# Patient Record
Sex: FEMALE | Race: BLACK OR AFRICAN AMERICAN | Employment: UNEMPLOYED | URBAN - METROPOLITAN AREA
[De-identification: names, ages, dates, MRNs, and addresses within clinical notes are randomized per-mention and may not be internally consistent; named-entity substitution may affect disease eponyms.]

---

## 2020-11-03 ENCOUNTER — HOSPITAL ENCOUNTER (EMERGENCY)
Age: 30
Discharge: HOME | End: 2020-11-03
Attending: EMERGENCY MEDICINE

## 2020-11-03 VITALS
DIASTOLIC BLOOD PRESSURE: 69 MMHG | HEART RATE: 76 BPM | OXYGEN SATURATION: 95 % | SYSTOLIC BLOOD PRESSURE: 115 MMHG | HEIGHT: 67 IN | BODY MASS INDEX: 18.83 KG/M2 | WEIGHT: 120 LBS | RESPIRATION RATE: 17 BRPM | TEMPERATURE: 98.1 F

## 2020-11-03 LAB
ANION GAP SERPL CALCULATED.3IONS-SCNC: 7 MMOL/L (ref 3–16)
BASOPHILS ABSOLUTE: 0 K/UL (ref 0–0.2)
BASOPHILS RELATIVE PERCENT: 0.7 %
BUN BLDV-MCNC: 9 MG/DL (ref 7–20)
CALCIUM SERPL-MCNC: 8.9 MG/DL (ref 8.3–10.6)
CHLORIDE BLD-SCNC: 105 MMOL/L (ref 99–110)
CO2: 25 MMOL/L (ref 21–32)
CREAT SERPL-MCNC: 0.8 MG/DL (ref 0.6–1.1)
EKG ATRIAL RATE: 73 BPM
EKG DIAGNOSIS: NORMAL
EKG P AXIS: 74 DEGREES
EKG P-R INTERVAL: 136 MS
EKG Q-T INTERVAL: 370 MS
EKG QRS DURATION: 70 MS
EKG QTC CALCULATION (BAZETT): 407 MS
EKG R AXIS: 101 DEGREES
EKG T AXIS: 55 DEGREES
EKG VENTRICULAR RATE: 73 BPM
EOSINOPHILS ABSOLUTE: 0.4 K/UL (ref 0–0.6)
EOSINOPHILS RELATIVE PERCENT: 5.6 %
GFR AFRICAN AMERICAN: >60
GFR NON-AFRICAN AMERICAN: >60
GLUCOSE BLD-MCNC: 96 MG/DL (ref 70–99)
HCG QUALITATIVE: NEGATIVE
HCT VFR BLD CALC: 40.1 % (ref 36–48)
HEMOGLOBIN: 13.4 G/DL (ref 12–16)
LYMPHOCYTES ABSOLUTE: 1.9 K/UL (ref 1–5.1)
LYMPHOCYTES RELATIVE PERCENT: 29.5 %
MCH RBC QN AUTO: 32.1 PG (ref 26–34)
MCHC RBC AUTO-ENTMCNC: 33.4 G/DL (ref 31–36)
MCV RBC AUTO: 96.1 FL (ref 80–100)
MONOCYTES ABSOLUTE: 0.4 K/UL (ref 0–1.3)
MONOCYTES RELATIVE PERCENT: 5.8 %
NEUTROPHILS ABSOLUTE: 3.7 K/UL (ref 1.7–7.7)
NEUTROPHILS RELATIVE PERCENT: 58.4 %
PDW BLD-RTO: 14.4 % (ref 12.4–15.4)
PLATELET # BLD: 164 K/UL (ref 135–450)
PMV BLD AUTO: 9.3 FL (ref 5–10.5)
POTASSIUM SERPL-SCNC: 4.9 MMOL/L (ref 3.5–5.1)
RBC # BLD: 4.17 M/UL (ref 4–5.2)
SODIUM BLD-SCNC: 137 MMOL/L (ref 136–145)
WBC # BLD: 6.4 K/UL (ref 4–11)

## 2020-11-03 PROCEDURE — 36415 COLL VENOUS BLD VENIPUNCTURE: CPT

## 2020-11-03 PROCEDURE — 6360000002 HC RX W HCPCS: Performed by: PHYSICIAN ASSISTANT

## 2020-11-03 PROCEDURE — 99284 EMERGENCY DEPT VISIT MOD MDM: CPT

## 2020-11-03 PROCEDURE — 93010 ELECTROCARDIOGRAM REPORT: CPT | Performed by: INTERNAL MEDICINE

## 2020-11-03 PROCEDURE — 85025 COMPLETE CBC W/AUTO DIFF WBC: CPT

## 2020-11-03 PROCEDURE — 80048 BASIC METABOLIC PNL TOTAL CA: CPT

## 2020-11-03 PROCEDURE — 2580000003 HC RX 258: Performed by: PHYSICIAN ASSISTANT

## 2020-11-03 PROCEDURE — 93005 ELECTROCARDIOGRAM TRACING: CPT | Performed by: EMERGENCY MEDICINE

## 2020-11-03 PROCEDURE — 84703 CHORIONIC GONADOTROPIN ASSAY: CPT

## 2020-11-03 PROCEDURE — 96365 THER/PROPH/DIAG IV INF INIT: CPT

## 2020-11-03 RX ORDER — LEVETIRACETAM 500 MG/5ML
1000 INJECTION, SOLUTION, CONCENTRATE INTRAVENOUS ONCE
Status: DISCONTINUED | OUTPATIENT
Start: 2020-11-03 | End: 2020-11-03

## 2020-11-03 RX ORDER — LEVETIRACETAM 500 MG/1
1000 TABLET ORAL 2 TIMES DAILY
COMMUNITY
End: 2020-11-03 | Stop reason: SDUPTHER

## 2020-11-03 RX ORDER — LEVETIRACETAM 10 MG/ML
1000 INJECTION INTRAVASCULAR ONCE
Status: COMPLETED | OUTPATIENT
Start: 2020-11-03 | End: 2020-11-03

## 2020-11-03 RX ORDER — 0.9 % SODIUM CHLORIDE 0.9 %
1000 INTRAVENOUS SOLUTION INTRAVENOUS ONCE
Status: COMPLETED | OUTPATIENT
Start: 2020-11-03 | End: 2020-11-03

## 2020-11-03 RX ORDER — LEVETIRACETAM 500 MG/1
TABLET ORAL
Qty: 150 TABLET | Refills: 0 | Status: SHIPPED | OUTPATIENT
Start: 2020-11-03 | End: 2021-01-27 | Stop reason: SDUPTHER

## 2020-11-03 RX ADMIN — SODIUM CHLORIDE 1000 ML: 9 INJECTION, SOLUTION INTRAVENOUS at 09:33

## 2020-11-03 RX ADMIN — LEVETIRACETAM 1000 MG: 10 INJECTION INTRAVENOUS at 10:28

## 2020-11-03 SDOH — HEALTH STABILITY: MENTAL HEALTH: HOW OFTEN DO YOU HAVE A DRINK CONTAINING ALCOHOL?: NEVER

## 2020-11-03 ASSESSMENT — ENCOUNTER SYMPTOMS
ABDOMINAL PAIN: 0
DIARRHEA: 0
RHINORRHEA: 0
SHORTNESS OF BREATH: 0
VOMITING: 0
COUGH: 0
NAUSEA: 0

## 2020-11-03 NOTE — ED PROVIDER NOTES
I personally evaluated and examined the patient in conjunction with the DON and agree with the assessment, treatment plan and disposition of the patient as recorded by the DON. I reviewed pertinent nursing notes, triage notes, vital signs, past medical history, family and social history, medications, and allergies. Complete review of systems was conducted by the DON and/or myself. Review of systems is negative except as documented in the history of present illness. Brief HPI: This is a 43-year-old female presents the emergency department chief complaint of breakthrough seizure. She reports that she has not been taking her seizure medications over the last couple of days. Physical Exam: Neuro: Alert and oriented ×3. Pupils are equal and reactive to light. Visual fields are tested and intact. Cranial nerves II through XII are tested and intact. No upper extremity drift. No lower extremity drift. Sensation is intact to light touch ×4 extremities. Muscle strength testing is 5/5×4 extremities. Finger to nose testing is normal. Normal intelligence and speech. Basic lab work performed is unremarkable. Given a bolus of Keppra here. She could not provide a urine specimen so this was discontinued. She is encouraged to take her seizure medications and follow-up with primary care and neurology and given strict return precautions. Note that she was given a prescription here for Keppra but reported to the nurse that she had enough at home. Patient instructed to follow up with their primary care doctor in one-two days and return to the emergency department if worsening of the condition or any other concerns. Please see discharge instructions for further delineation regarding the specific discharge instructions explained and given to the patient. EKG: EKG interpretation by ED physician: Normal axis, sinus rhythm at 73 bpm.  No obvious ischemic ST changes appreciated.     FINAL IMPRESSION     1. Breakthrough seizure [facility])            Electronically signed by:   Diogo Dawkins DO  11/03/20 3967

## 2020-11-03 NOTE — ED NOTES
Bed: 23  Expected date:   Expected time:   Means of arrival: Ambulance  Comments:  Bed [address] [facility]  11/03/20 2019

## 2020-11-03 NOTE — ED NOTES
Pt remains in bed at this time with no acute sign of distress. Bed in lowest position with wheels locked. Call light within reach.       Isai Ace RN  27/41/74 2500

## 2020-11-03 NOTE — ED PROVIDER NOTES
posterior oropharyngeal erythema. Eyes:      General:         Right eye: No discharge. Left eye: No discharge. Extraocular Movements: Extraocular movements intact. Conjunctiva/sclera: Conjunctivae normal.      Pupils: Pupils are equal, round, and reactive to light. Neck:      Musculoskeletal: Normal range of motion and neck supple. Trachea: No tracheal deviation. Cardiovascular:      Rate and Rhythm: Normal rate and regular rhythm. Heart sounds: No murmur. Pulmonary:      Effort: Pulmonary effort is normal. No respiratory distress. Breath sounds: Normal breath sounds. No wheezing or rales. Abdominal:      General: Bowel sounds are normal. There is no distension. Palpations: Abdomen is soft. Tenderness: There is no abdominal tenderness. There is no guarding. Musculoskeletal: Normal range of motion. Skin:     General: Skin is warm and dry. Neurological:      Mental Status: She is alert and oriented to person, place, and time. GCS: GCS eye subscore is 4. GCS verbal subscore is 5. GCS motor subscore is 6. Psychiatric:         Behavior: Behavior normal.         DIAGNOSTIC RESULTS   LABS:    Labs Reviewed   CBC WITH AUTO DIFFERENTIAL    Narrative:     Performed at:  [facility] [address], [place], [address]   Phone [phone]   BASIC METABOLIC PANEL    Narrative:     Performed at:  [facility] [address], [place], [facility] [address]   Phone [phone]   HCG, SERUM, QUALITATIVE    Narrative:     Performed at:  [facility] [address], [place], [address]   Phone [phone]       All other labs were within normal range or not returned as of this dictation. EKG: All EKG's are interpreted by the Emergency Department Physician in the absence of a cardiologist.  Please see their note for interpretation of EKG.       RADIOLOGY:   Non-plain film images such as CT, Ultrasound and MRI are read by the radiologist. Plain radiographic images are visualized and preliminarily interpreted by the ED Provider with the below findings:        Interpretation per the Radiologist below, if available at the time of this note:    No orders to display     No results found. PROCEDURES   Unless otherwise noted below, none     Procedures    CRITICAL CARE TIME   N/A    CONSULTS:  None      EMERGENCY DEPARTMENT COURSE and DIFFERENTIAL DIAGNOSIS/MDM:   Vitals:    Vitals:    11/03/20 1015 11/03/20 1030 11/03/20 1045 11/03/20 1100   BP:  123/75  115/69   Pulse: 71 77 79 76   Resp: 13 17 17 17   Temp:       SpO2: 99% 99% 97% 95%   Weight:       Height:           Patient was given the following medications:  Medications   0.9 % sodium chloride bolus (0 mLs Intravenous Stopped 11/3/20 1138)   levetiracetam (KEPPRA) 1000 mg/100 mL IVPB (0 mg Intravenous Stopped 11/3/20 1138)           Briefly, this is a 72-year-old female with a history of seizures who presents to the emergency department today for a breakthrough seizure. The patient does have a history of seizures although she is unclear the exact events of today. She does not feel that she has had a seizure. She tells me she is not been taking her Keppra for the past couple of days. She has no complaints. Physical exam is unremarkable. There are no focal neurological deficits. CBC shows no evidence of leukocytosis or anemia. BMP is unremarkable. Pregnancy is negative. EKG is documented by my attending, please see his note for further details    The patient was given fluids as well as a loading dose of Keppra in the ED, patient has remained asymptomatic. I feel that she likely had a breakthrough seizure as she did not take her medications for the past few days. Patient was referred to neurology for follow-up within 2 to 3 days. She is to return to the ED for any new or worsening symptoms.   Patient voiced understanding is agreeable with plan. Stable for discharge. My suspicions at this time for acute internal hemorrhage, subarachnoid hemorrhage, meningitis, ectopic pregnancy, life-threatening arrhythmia, status epilepticus or other emergent etiology    FINAL IMPRESSION      1.  Breakthrough seizure [facility])          DISPOSITION/PLAN   DISPOSITION        PATIENT REFERREDTO:  Obdulia Tran MD  [address] [place] [address], [address] [facility]  [phone]    Schedule an appointment as soon as possible for a visit       [facility] [place] [place]) Pre-Services  [phone]  Schedule an appointment as soon as possible for a visit       [facility] Emergency Department  [address] [place] [address]  [phone]    If symptoms worsen      DISCHARGE MEDICATIONS:  Discharge Medication List as of 11/3/2020 11:38 AM          DISCONTINUED MEDICATIONS:  Discharge Medication List as of 11/3/2020 11:38 AM                 (Please note that portions of this note were completed with a voice recognition program.  Efforts were made to edit the dictations but occasionally words are mis-transcribed.)    Brent Atkinson PA-C (electronically signed)            Brent Atkinson PA-C  11/03/20 0738

## 2020-12-14 ENCOUNTER — APPOINTMENT (OUTPATIENT)
Dept: CT IMAGING | Age: 30
End: 2020-12-14
Payer: COMMERCIAL

## 2020-12-14 ENCOUNTER — HOSPITAL ENCOUNTER (EMERGENCY)
Age: 30
Discharge: HOME | End: 2020-12-14
Attending: EMERGENCY MEDICINE
Payer: COMMERCIAL

## 2020-12-14 VITALS
HEIGHT: 71 IN | BODY MASS INDEX: 13.3 KG/M2 | OXYGEN SATURATION: 98 % | SYSTOLIC BLOOD PRESSURE: 114 MMHG | HEART RATE: 93 BPM | RESPIRATION RATE: 18 BRPM | TEMPERATURE: 99.3 F | WEIGHT: 95 LBS | DIASTOLIC BLOOD PRESSURE: 76 MMHG

## 2020-12-14 LAB
A/G RATIO: 1.6 (ref 1.1–2.2)
ALBUMIN SERPL-MCNC: 4.1 G/DL (ref 3.4–5)
ALP BLD-CCNC: 88 U/L (ref 40–129)
ALT SERPL-CCNC: 12 U/L (ref 10–40)
AMPHETAMINE SCREEN, URINE: NORMAL
ANION GAP SERPL CALCULATED.3IONS-SCNC: 9 MMOL/L (ref 3–16)
AST SERPL-CCNC: 14 U/L (ref 15–37)
BACTERIA: ABNORMAL /HPF
BARBITURATE SCREEN URINE: NORMAL
BASOPHILS ABSOLUTE: 0.1 K/UL (ref 0–0.2)
BASOPHILS RELATIVE PERCENT: 0.6 %
BENZODIAZEPINE SCREEN, URINE: NORMAL
BILIRUB SERPL-MCNC: <0.2 MG/DL (ref 0–1)
BILIRUBIN URINE: NEGATIVE
BLOOD, URINE: NEGATIVE
BUN BLDV-MCNC: 18 MG/DL (ref 7–20)
CALCIUM SERPL-MCNC: 9.1 MG/DL (ref 8.3–10.6)
CANNABINOID SCREEN URINE: NORMAL
CHLORIDE BLD-SCNC: 106 MMOL/L (ref 99–110)
CLARITY: ABNORMAL
CO2: 23 MMOL/L (ref 21–32)
COCAINE METABOLITE SCREEN URINE: NORMAL
COLOR: YELLOW
CREAT SERPL-MCNC: 0.9 MG/DL (ref 0.6–1.1)
EOSINOPHILS ABSOLUTE: 0.3 K/UL (ref 0–0.6)
EOSINOPHILS RELATIVE PERCENT: 2.2 %
EPITHELIAL CELLS, UA: 2 /HPF (ref 0–5)
ETHANOL: NORMAL MG/DL (ref 0–0.08)
GFR AFRICAN AMERICAN: >60
GFR NON-AFRICAN AMERICAN: >60
GLOBULIN: 2.5 G/DL
GLUCOSE BLD-MCNC: 95 MG/DL (ref 70–99)
GLUCOSE URINE: NEGATIVE MG/DL
HCG QUALITATIVE: NEGATIVE
HCT VFR BLD CALC: 40.5 % (ref 36–48)
HEMOGLOBIN: 13.5 G/DL (ref 12–16)
HYALINE CASTS: 4 /LPF (ref 0–8)
KEPPRA DOSE AMT: ABNORMAL
KEPPRA: <2 UG/ML (ref 6–46)
KETONES, URINE: NEGATIVE MG/DL
LACTIC ACID: 1.8 MMOL/L (ref 0.4–2)
LEUKOCYTE ESTERASE, URINE: ABNORMAL
LYMPHOCYTES ABSOLUTE: 1.9 K/UL (ref 1–5.1)
LYMPHOCYTES RELATIVE PERCENT: 15.7 %
Lab: NORMAL
MCH RBC QN AUTO: 32.3 PG (ref 26–34)
MCHC RBC AUTO-ENTMCNC: 33.2 G/DL (ref 31–36)
MCV RBC AUTO: 97.4 FL (ref 80–100)
METHADONE SCREEN, URINE: NORMAL
MICROSCOPIC EXAMINATION: YES
MONOCYTES ABSOLUTE: 0.7 K/UL (ref 0–1.3)
MONOCYTES RELATIVE PERCENT: 5.4 %
NEUTROPHILS ABSOLUTE: 9.4 K/UL (ref 1.7–7.7)
NEUTROPHILS RELATIVE PERCENT: 76.1 %
NITRITE, URINE: POSITIVE
OPIATE SCREEN URINE: NORMAL
OXYCODONE URINE: NORMAL
PDW BLD-RTO: 13.6 % (ref 12.4–15.4)
PH UA: 5.5
PH UA: 5.5 (ref 5–8)
PHENCYCLIDINE SCREEN URINE: NORMAL
PLATELET # BLD: 180 K/UL (ref 135–450)
PMV BLD AUTO: 8.6 FL (ref 5–10.5)
POTASSIUM REFLEX MAGNESIUM: 5.4 MMOL/L (ref 3.5–5.1)
PROPOXYPHENE SCREEN: NORMAL
PROTEIN UA: NEGATIVE MG/DL
RBC # BLD: 4.16 M/UL (ref 4–5.2)
RBC UA: 2 /HPF (ref 0–4)
SODIUM BLD-SCNC: 138 MMOL/L (ref 136–145)
SPECIFIC GRAVITY UA: 1.02 (ref 1–1.03)
TOTAL PROTEIN: 6.6 G/DL (ref 6.4–8.2)
URINE REFLEX TO CULTURE: YES
URINE TYPE: ABNORMAL
UROBILINOGEN, URINE: 0.2 E.U./DL
WBC # BLD: 12.4 K/UL (ref 4–11)
WBC UA: 20 /HPF (ref 0–5)

## 2020-12-14 PROCEDURE — 80177 DRUG SCRN QUAN LEVETIRACETAM: CPT

## 2020-12-14 PROCEDURE — 6360000002 HC RX W HCPCS: Performed by: EMERGENCY MEDICINE

## 2020-12-14 PROCEDURE — 83605 ASSAY OF LACTIC ACID: CPT

## 2020-12-14 PROCEDURE — 93005 ELECTROCARDIOGRAM TRACING: CPT | Performed by: EMERGENCY MEDICINE

## 2020-12-14 PROCEDURE — 84703 CHORIONIC GONADOTROPIN ASSAY: CPT

## 2020-12-14 PROCEDURE — 80307 DRUG TEST PRSMV CHEM ANLYZR: CPT

## 2020-12-14 PROCEDURE — 99283 EMERGENCY DEPT VISIT LOW MDM: CPT

## 2020-12-14 PROCEDURE — 96365 THER/PROPH/DIAG IV INF INIT: CPT

## 2020-12-14 PROCEDURE — 87186 SC STD MICRODIL/AGAR DIL: CPT

## 2020-12-14 PROCEDURE — 87086 URINE CULTURE/COLONY COUNT: CPT

## 2020-12-14 PROCEDURE — 85025 COMPLETE CBC W/AUTO DIFF WBC: CPT

## 2020-12-14 PROCEDURE — 87088 URINE BACTERIA CULTURE: CPT

## 2020-12-14 PROCEDURE — 80053 COMPREHEN METABOLIC PANEL: CPT

## 2020-12-14 PROCEDURE — G0480 DRUG TEST DEF 1-7 CLASSES: HCPCS

## 2020-12-14 PROCEDURE — 70450 CT HEAD/BRAIN W/O DYE: CPT

## 2020-12-14 PROCEDURE — 81001 URINALYSIS AUTO W/SCOPE: CPT

## 2020-12-14 RX ORDER — NITROFURANTOIN 25; 75 MG/1; MG/1
100 CAPSULE ORAL 2 TIMES DAILY
Qty: 14 CAPSULE | Refills: 0 | Status: SHIPPED | OUTPATIENT
Start: 2020-12-14 | End: 2020-12-21

## 2020-12-14 RX ORDER — LEVETIRACETAM 10 MG/ML
1000 INJECTION INTRAVASCULAR ONCE
Status: COMPLETED | OUTPATIENT
Start: 2020-12-14 | End: 2020-12-14

## 2020-12-14 RX ADMIN — LEVETIRACETAM 1000 MG: 10 INJECTION INTRAVENOUS at 04:25

## 2020-12-14 NOTE — ED NOTES
Discharge instructions given, patient acknowledged understanding, rx given x1, patient was taken by wheelchair to waiting room to wait on lorena Esparza [facility]  12/14/20 9084

## 2020-12-14 NOTE — ED PROVIDER NOTES
[facility] Emergency Department    CHIEF COMPLAINT  Chief Complaint   Patient presents with    Seizures     pt brought to ed by  ems for seizures, father stated she has had multiple seizures today denies pain       HISTORY OF PRESENT ILLNESS  Riaz Campa is a 27 y.o. female  who presents to the ED complaining of what she tells me was two seizures today - one at 4pm and one just prior to arrival.  EMS seems to indicate that it may have been more than that. She says she has a seizure disorder and is on Keppra. She does not recall them. No visitors at bedside so unclear how to describe them further. She did not bite her tongue but also has no teeth. No headaches currently. She feels normal right now. No fevers or neck pain/stiffness recently. No loss of bowel/bladder continence during the seizures. Before today, she does not really get seizures often, was here in November on the 3rd for a breakthrough seizure. Neurologist is in [place]. She has been compliant with her Keppra 1500mg QHS and 1000mg QAM, denies missed doses recently. She says she has been diagnosed with epilepsy specifically. No abd pain n/v/d chest pain SOB cough or other acute symptoms. Denies drug or alcohol use at all including marijuana. No other complaints, modifying factors or associated symptoms. I have reviewed the following from the nursing documentation. Past Medical History:   Diagnosis Date    Seizures ([address]     Onset at9 years old      History reviewed. No pertinent surgical history. History reviewed. No pertinent family history.   Social History     Socioeconomic History    Marital status: Single     Spouse name: Not on file    Number of children: Not on file    Years of education: Not on file    Highest education level: Not on file   Occupational History    Not on file   Social Needs    Financial resource strain: Not on file    Food insecurity     Worry: Not on file murmurs. No chest wall tenderness. LUNGS: Respirations unlabored. CTAB. Good air exchange. Speaking comfortably in full sentences. ABDOMEN: No tenderness. Soft. Non-distended. No masses. No organomegaly. No guarding or rebound. Normal bowel sounds throughout. MUSCULOSKELETAL: No extremity edema. Compartments soft. No deformity. No tenderness in the extremities. All extremities neurovascularly intact. SKIN: Warm and dry. No acute rashes. NEUROLOGICAL: Alert and oriented. CN's 2-12 intact. No gross facial drooping. Strength 5/5, sensation intact. 2 plus DTR's in knees bilaterally. Gait normal.  PSYCHIATRIC: Normal mood and affect. LABS  I have reviewed all labs for this visit.    Results for orders placed or performed during the hospital encounter of 12/14/20   CBC auto differential   Result Value Ref Range    WBC 12.4 (H) 4.0 - 11.0 K/uL    RBC 4.16 4.00 - 5.20 M/uL    Hemoglobin 13.5 12.0 - 16.0 g/dL    Hematocrit 40.5 36.0 - 48.0 %    MCV 97.4 80.0 - 100.0 fL    MCH 32.3 26.0 - 34.0 pg    MCHC 33.2 31.0 - 36.0 g/dL    RDW 13.6 12.4 - 15.4 %    Platelets 376 940 - 021 K/uL    MPV 8.6 5.0 - 10.5 fL    Neutrophils % 76.1 %    Lymphocytes % 15.7 %    Monocytes % 5.4 %    Eosinophils % 2.2 %    Basophils % 0.6 %    Neutrophils Absolute 9.4 (H) 1.7 - 7.7 K/uL    Lymphocytes Absolute 1.9 1.0 - 5.1 K/uL    Monocytes Absolute 0.7 0.0 - 1.3 K/uL    Eosinophils Absolute 0.3 0.0 - 0.6 K/uL    Basophils Absolute 0.1 0.0 - 0.2 K/uL   Comprehensive Metabolic Panel w/ Reflex to MG   Result Value Ref Range    Sodium 138 136 - 145 mmol/L    Potassium reflex Magnesium 5.4 (H) 3.5 - 5.1 mmol/L    Chloride 106 99 - 110 mmol/L    CO2 23 21 - 32 mmol/L    Anion Gap 9 3 - 16    Glucose 95 70 - 99 mg/dL    BUN 18 7 - 20 mg/dL    CREATININE 0.9 0.6 - 1.1 mg/dL    GFR Non-African American >60 >60    GFR African American >60 >60    Calcium 9.1 8.3 - 10.6 mg/dL    Total Protein 6.6 6.4 - 8.2 g/dL    Alb 4.1 3.4 - 5.0 g/dL Albumin/Globulin Ratio 1.6 1.1 - 2.2    Total Bilirubin <0.2 0.0 - 1.0 mg/dL    Alkaline Phosphatase 88 40 - 129 U/L    ALT 12 10 - 40 U/L    AST 14 (L) 15 - 37 U/L    Globulin 2.5 g/dL   Ethanol   Result Value Ref Range    Ethanol Lvl None Detected mg/dL   Lactic Acid, Plasma   Result Value Ref Range    Lactic Acid 1.8 0.4 - 2.0 mmol/L   Urinalysis Reflex to Culture    Specimen: Urine, clean catch   Result Value Ref Range    Color, UA YELLOW Straw/Yellow    Clarity, UA CLOUDY (A) Clear    Glucose, Ur Negative Negative mg/dL    Bilirubin Urine Negative Negative    Ketones, Urine Negative Negative mg/dL    Specific Gravity, UA 1.020 1.005 - 1.030    Blood, Urine Negative Negative    pH, UA 5.5 5.0 - 8.0    Protein, UA Negative Negative mg/dL    Urobilinogen, Urine 0.2 <2.0 E.U./dL    Nitrite, Urine POSITIVE (A) Negative    Leukocyte Esterase, Urine SMALL (A) Negative    Microscopic Examination YES     Urine Type NotGiven     Urine Reflex to Culture Yes    Drug screen multi urine   Result Value Ref Range    Amphetamine Screen, Urine Neg Negative <1000ng/mL    Barbiturate Screen, Ur Neg Negative <200 ng/mL    Benzodiazepine Screen, Urine Neg Negative <200 ng/mL    Cannabinoid Scrn, Ur Neg Negative <50 ng/mL    Cocaine Metabolite Screen, Urine Neg Negative <300 ng/mL    Opiate Scrn, Ur Neg Negative <300 ng/mL    PCP Screen, Urine Neg Negative <25 ng/mL    Methadone Screen, Urine Neg Negative <300 ng/mL    Propoxyphene Scrn, Ur Neg Negative <300 ng/mL    Oxycodone Urine Neg Negative <100 ng/ml    pH, UA 5.5     Drug Screen Comment: see below    hCG, serum, qualitative   Result Value Ref Range    hCG Qual Negative Detects HCG level >10 MIU/mL   Levetiracetam level   Result Value Ref Range    Levetiracetam Lvl <2.0 (L) 6.0 - 46.0 ug/mL    KEPPRA Dose Amt Unknown    Microscopic Urinalysis   Result Value Ref Range    Bacteria, UA 4+ (A) None Seen /HPF    Hyaline Casts, UA 4 0 - 8 /LPF    WBC, UA 20 (H) 0 - 5 /HPF    RBC, UA 2 0 - 4 /HPF    Epithelial Cells, UA 2 0 - 5 /HPF   EKG 12 Lead   Result Value Ref Range    Ventricular Rate 91 BPM    Atrial Rate 91 BPM    P-R Interval 130 ms    QRS Duration 76 ms    Q-T Interval 332 ms    QTc Calculation (Bazett) 408 ms    P Axis 81 degrees    R Axis 93 degrees    T Axis 66 degrees    Diagnosis Normal sinus rhythmRightward axis      The 12 lead EKG was interpreted by me as follows:  Rate: normal with a rate of 91  Rhythm: sinus  Axis: right  Intervals: normal LA, narrow QRS, normal QTc  ST segments: no ST elevations or depressions  T waves: no abnormal inversions  Non-specific T wave changes: not present  Prior EKG comparison: EKG dated 11/3/20 is not significantly different    RADIOLOGY    Ct Head Wo Contrast    Result Date: 12/14/2020  EXAMINATION: CT OF THE HEAD WITHOUT CONTRAST  12/14/2020 2:39 am TECHNIQUE: CT of the head was performed without the administration of intravenous contrast. Dose modulation, iterative reconstruction, and/or weight based adjustment of the mA/kV was utilized to reduce the radiation dose to as low as reasonably achievable. COMPARISON: None. HISTORY: ORDERING SYSTEM PROVIDED HISTORY: seizures TECHNOLOGIST PROVIDED HISTORY: Reason for exam:->seizures Has a \"code stroke\" or \"stroke alert\" been called? ->No Is the patient pregnant?->No Reason for Exam: Seizures (pt brought to ed by ff ems for seizures, father stated she has had multiple seizures today denies pain ) Acuity: Acute Type of Exam: Initial FINDINGS: BRAIN/VENTRICLES: There is no acute intracranial hemorrhage, mass effect or midline shift. No abnormal extra-axial fluid collection. The gray-white differentiation is maintained without evidence of an acute infarct. There is no evidence of hydrocephalus. ORBITS: The visualized portion of the orbits demonstrate no acute abnormality. SINUSES: Mucous retention cyst versus polyps are identified in the sphenoid sinuses.   The remaining paranasal sinuses and mastoid air cells are clear. SOFT TISSUES/SKULL:  No acute abnormality of the visualized skull or soft tissues. No acute intracranial abnormality. ED COURSE/MDM  Patient seen and evaluated. Old records reviewed. Labs and imaging reviewed and results discussed with patient. After initial evaluation, differential diagnostic considerations included: seizure, stroke, TIA, intracranial bleed, trauma, vasovagal reaction, orthostatic reaction/dehydration, medication side effect, intoxication, metabolic/electrolyte disturbance, blood sugar disturbance, cardiac dysrhythmia    The patient's ED workup was notable for seizure, breakthrough. Though pt has known seizure history, she is new to this area and has no previous recent brain imaging to speak of so I do feel that HCT is appropriate. This showed no acute findings. Normal sugar and sodium, labs otherwise pretty unremarkable, lactate wnl. Etoh and utox negative. She reports Keppra compliance and her level today is undetectable. Keppra load administered here. She says she has a prescription at home already. She swears many times even when I showed her the undetectable level that she is compliant so all I can do is encourage ongoing use of the medication as intended. She declined new prescriptions when offered. Rx macrobid for incidental UTI. Dc with new PCP referral and strict return precautions. During the patient's ED course, the patient was given:  Medications   levetiracetam (KEPPRA) 1000 mg/100 mL IVPB (1,000 mg Intravenous New Bag 12/14/20 0425)        CLINICAL IMPRESSION  1. Breakthrough seizure ([facility]    2. Seizure secondary to subtherapeutic anticonvulsant medication ([facility]    3. Acute cystitis without hematuria        Blood pressure 114/76, pulse 93, temperature 99.3 °F (37.4 °C), temperature source Oral, resp. rate 18, height 5' 11\" (1.803 m), weight 95 lb (43.1 kg), SpO2 98 %.     DISPOSITION  Art Carmen was discharged to home in stable condition. I have discussed the findings of today's workup with the patient and addressed the patient's questions and concerns. Important warning signs as well as new or worsening symptoms which would necessitate immediate return to the ED were discussed. The plan is to discharge from the ED at this time, and the patient is in stable condition. The patient acknowledged understanding is agreeable with this plan. Patient was given scripts for the following medications. I counseled patient how to take these medications. New Prescriptions    NITROFURANTOIN, MACROCRYSTAL-MONOHYDRATE, (MACROBID) 100 MG CAPSULE    Take 1 capsule by mouth 2 times daily for 7 days       Follow-up with:  [facility] Emergency Department  [address] [facility]  [phone]  Go to   If symptoms worsen    New PCP referral made for you            DISCLAIMER: This chart was created using Dragon dictation software. Efforts were made by me to ensure accuracy, however some errors may be present due to limitations of this technology and occasionally words are not transcribed correctly.         Tena Frias MD  12/14/20 8579

## 2020-12-14 NOTE — ED NOTES
Patient ambulated to bathroom with assistance, urine sample obtained and sent to lab     Zachary Elena RN  12/14/20 0109

## 2020-12-15 LAB
EKG ATRIAL RATE: 91 BPM
EKG DIAGNOSIS: NORMAL
EKG P AXIS: 81 DEGREES
EKG P-R INTERVAL: 130 MS
EKG Q-T INTERVAL: 332 MS
EKG QRS DURATION: 76 MS
EKG QTC CALCULATION (BAZETT): 408 MS
EKG R AXIS: 93 DEGREES
EKG T AXIS: 66 DEGREES
EKG VENTRICULAR RATE: 91 BPM

## 2020-12-15 PROCEDURE — 93010 ELECTROCARDIOGRAM REPORT: CPT | Performed by: INTERNAL MEDICINE

## 2020-12-16 LAB
ORGANISM: ABNORMAL
URINE CULTURE, ROUTINE: ABNORMAL

## 2021-01-27 ENCOUNTER — HOSPITAL ENCOUNTER (EMERGENCY)
Age: 31
Discharge: HOME | End: 2021-01-27
Attending: EMERGENCY MEDICINE
Payer: COMMERCIAL

## 2021-01-27 VITALS
TEMPERATURE: 97.1 F | DIASTOLIC BLOOD PRESSURE: 51 MMHG | SYSTOLIC BLOOD PRESSURE: 99 MMHG | OXYGEN SATURATION: 99 % | HEART RATE: 54 BPM | HEIGHT: 71 IN | RESPIRATION RATE: 16 BRPM | WEIGHT: 95 LBS | BODY MASS INDEX: 13.3 KG/M2

## 2021-01-27 DIAGNOSIS — G40.919 BREAKTHROUGH SEIZURE (HCC): Primary | ICD-10-CM

## 2021-01-27 LAB
A/G RATIO: 2.4 (ref 1.1–2.2)
ALBUMIN SERPL-MCNC: 4 G/DL (ref 3.4–5)
ALP BLD-CCNC: 81 U/L (ref 40–129)
ALT SERPL-CCNC: 13 U/L (ref 10–40)
ANION GAP SERPL CALCULATED.3IONS-SCNC: 10 MMOL/L (ref 3–16)
AST SERPL-CCNC: 15 U/L (ref 15–37)
BASOPHILS ABSOLUTE: 0 K/UL (ref 0–0.2)
BASOPHILS RELATIVE PERCENT: 0.7 %
BILIRUB SERPL-MCNC: 0.3 MG/DL (ref 0–1)
BILIRUBIN URINE: NEGATIVE
BLOOD, URINE: NEGATIVE
BUN BLDV-MCNC: 8 MG/DL (ref 7–20)
CALCIUM SERPL-MCNC: 8.9 MG/DL (ref 8.3–10.6)
CHLORIDE BLD-SCNC: 106 MMOL/L (ref 99–110)
CLARITY: ABNORMAL
CO2: 24 MMOL/L (ref 21–32)
COLOR: YELLOW
CREAT SERPL-MCNC: 0.7 MG/DL (ref 0.6–1.1)
EOSINOPHILS ABSOLUTE: 0.4 K/UL (ref 0–0.6)
EOSINOPHILS RELATIVE PERCENT: 4.7 %
EPITHELIAL CELLS, UA: 3 /HPF (ref 0–5)
GFR AFRICAN AMERICAN: >60
GFR NON-AFRICAN AMERICAN: >60
GLOBULIN: 1.7 G/DL
GLUCOSE BLD-MCNC: 103 MG/DL (ref 70–99)
GLUCOSE URINE: NEGATIVE MG/DL
HCG QUALITATIVE: NEGATIVE
HCT VFR BLD CALC: 39.8 % (ref 36–48)
HEMOGLOBIN: 13 G/DL (ref 12–16)
HYALINE CASTS: 2 /LPF (ref 0–8)
KEPPRA DOSE AMT: NORMAL
KEPPRA: 6.2 UG/ML (ref 6–46)
KETONES, URINE: NEGATIVE MG/DL
LACTIC ACID: 2.7 MMOL/L (ref 0.4–2)
LEUKOCYTE ESTERASE, URINE: NEGATIVE
LYMPHOCYTES ABSOLUTE: 2 K/UL (ref 1–5.1)
LYMPHOCYTES RELATIVE PERCENT: 26.9 %
MCH RBC QN AUTO: 32 PG (ref 26–34)
MCHC RBC AUTO-ENTMCNC: 32.8 G/DL (ref 31–36)
MCV RBC AUTO: 97.7 FL (ref 80–100)
MICROSCOPIC EXAMINATION: YES
MONOCYTES ABSOLUTE: 0.5 K/UL (ref 0–1.3)
MONOCYTES RELATIVE PERCENT: 6.3 %
NEUTROPHILS ABSOLUTE: 4.6 K/UL (ref 1.7–7.7)
NEUTROPHILS RELATIVE PERCENT: 61.4 %
NITRITE, URINE: NEGATIVE
PDW BLD-RTO: 13.1 % (ref 12.4–15.4)
PH UA: 6 (ref 5–8)
PLATELET # BLD: 176 K/UL (ref 135–450)
PMV BLD AUTO: 9 FL (ref 5–10.5)
POTASSIUM REFLEX MAGNESIUM: 3.9 MMOL/L (ref 3.5–5.1)
PROTEIN UA: NEGATIVE MG/DL
RBC # BLD: 4.07 M/UL (ref 4–5.2)
RBC UA: 1 /HPF (ref 0–4)
SODIUM BLD-SCNC: 140 MMOL/L (ref 136–145)
SPECIFIC GRAVITY UA: 1.01 (ref 1–1.03)
TOTAL PROTEIN: 5.7 G/DL (ref 6.4–8.2)
URINE TYPE: ABNORMAL
UROBILINOGEN, URINE: 0.2 E.U./DL
WBC # BLD: 7.5 K/UL (ref 4–11)
WBC UA: 5 /HPF (ref 0–5)

## 2021-01-27 PROCEDURE — 36415 COLL VENOUS BLD VENIPUNCTURE: CPT

## 2021-01-27 PROCEDURE — 96365 THER/PROPH/DIAG IV INF INIT: CPT

## 2021-01-27 PROCEDURE — 85025 COMPLETE CBC W/AUTO DIFF WBC: CPT

## 2021-01-27 PROCEDURE — 84703 CHORIONIC GONADOTROPIN ASSAY: CPT

## 2021-01-27 PROCEDURE — 83605 ASSAY OF LACTIC ACID: CPT

## 2021-01-27 PROCEDURE — 87086 URINE CULTURE/COLONY COUNT: CPT

## 2021-01-27 PROCEDURE — 80053 COMPREHEN METABOLIC PANEL: CPT

## 2021-01-27 PROCEDURE — 80177 DRUG SCRN QUAN LEVETIRACETAM: CPT

## 2021-01-27 PROCEDURE — 6360000002 HC RX W HCPCS: Performed by: EMERGENCY MEDICINE

## 2021-01-27 PROCEDURE — 81001 URINALYSIS AUTO W/SCOPE: CPT

## 2021-01-27 PROCEDURE — 2580000003 HC RX 258: Performed by: EMERGENCY MEDICINE

## 2021-01-27 PROCEDURE — 99285 EMERGENCY DEPT VISIT HI MDM: CPT

## 2021-01-27 RX ORDER — LEVETIRACETAM 10 MG/ML
1000 INJECTION INTRAVASCULAR EVERY 12 HOURS
Status: DISCONTINUED | OUTPATIENT
Start: 2021-01-27 | End: 2021-01-27 | Stop reason: HOSPADM

## 2021-01-27 RX ORDER — 0.9 % SODIUM CHLORIDE 0.9 %
1000 INTRAVENOUS SOLUTION INTRAVENOUS ONCE
Status: COMPLETED | OUTPATIENT
Start: 2021-01-27 | End: 2021-01-27

## 2021-01-27 RX ORDER — LEVETIRACETAM 500 MG/1
TABLET ORAL
Qty: 150 TABLET | Refills: 0 | Status: SHIPPED | OUTPATIENT
Start: 2021-01-27 | End: 2021-03-01 | Stop reason: DRUGHIGH

## 2021-01-27 RX ADMIN — LEVETIRACETAM 1000 MG: 10 INJECTION INTRAVENOUS at 06:38

## 2021-01-27 RX ADMIN — SODIUM CHLORIDE 1000 ML: 9 INJECTION, SOLUTION INTRAVENOUS at 08:03

## 2021-01-27 NOTE — ED NOTES
Seizure pads in place, pt is alert and orientated. No s/s of distress. Pt respirations easy, even, and unlabored.       Renata Hays RN  01/27/21 0153

## 2021-01-27 NOTE — ED NOTES
Pt resting with light off,  pt O2 dropped to 87% pt placed on 2L O2 sats jumped back up to 97%. Pt remains with seizure pads in place. No s/s of distress. Pt states that she doesn't need to urinated now will attempt later.            Dolores Deluca RN  01/27/21 8261

## 2021-01-27 NOTE — ED NOTES
Pt. Sleeping on cart with seizure pads in place. Pt. Opens eyes to voice, skin is warm an dry, follows commands and answers questions appropriately. VSS.      Mendel Gaudier, RN  01/27/21 4550

## 2021-01-27 NOTE — LETTER
[facility] Emergency Department  [address] [address]  Phone: [phone]               January 27, 2021    Patient: Carmen Lai   YOB: 1990   Date of Visit: 1/27/2021       To Whom It May Concern:    Carmen Lai was seen and treated in our emergency department on 1/27/2021.  She may return to work on 1/28/21 without restriction       Sincerely, Dr. Haley Pena               Signature:__________________________________

## 2021-01-28 LAB — URINE CULTURE, ROUTINE: NORMAL
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
## 2021-03-01 ENCOUNTER — VIRTUAL VISIT (OUTPATIENT)
Dept: FAMILY MEDICINE CLINIC | Age: 31
End: 2021-03-01
Payer: COMMERCIAL

## 2021-03-01 DIAGNOSIS — Z71.89 EDUCATED ABOUT 2019 NOVEL CORONAVIRUS INFECTION: ICD-10-CM

## 2021-03-01 DIAGNOSIS — R41.3 MEMORY DISTURBANCE: ICD-10-CM

## 2021-03-01 DIAGNOSIS — Z87.891 PERSONAL HISTORY OF TOBACCO USE, PRESENTING HAZARDS TO HEALTH: ICD-10-CM

## 2021-03-01 DIAGNOSIS — Z78.9 HEPATITIS B VACCINATION STATUS UNKNOWN: ICD-10-CM

## 2021-03-01 DIAGNOSIS — G40.909 SEIZURE DISORDER (HCC): ICD-10-CM

## 2021-03-01 DIAGNOSIS — Z91.49 HISTORY OF PSYCHOLOGICAL TRAUMA: ICD-10-CM

## 2021-03-01 DIAGNOSIS — Z76.89 ENCOUNTER TO ESTABLISH CARE WITH NEW DOCTOR: Primary | ICD-10-CM

## 2021-03-01 PROCEDURE — 99214 OFFICE O/P EST MOD 30 MIN: CPT | Performed by: FAMILY MEDICINE

## 2021-03-01 RX ORDER — LEVETIRACETAM 1000 MG/1
TABLET ORAL
Qty: 60 TABLET | Refills: 0 | Status: SHIPPED | OUTPATIENT
Start: 2021-03-01 | End: 2021-03-26 | Stop reason: SDUPTHER

## 2021-03-01 ASSESSMENT — PATIENT HEALTH QUESTIONNAIRE - PHQ9
SUM OF ALL RESPONSES TO PHQ9 QUESTIONS 1 & 2: 0
SUM OF ALL RESPONSES TO PHQ QUESTIONS 1-9: 0
1. LITTLE INTEREST OR PLEASURE IN DOING THINGS: 0
SUM OF ALL RESPONSES TO PHQ QUESTIONS 1-9: 0
SUM OF ALL RESPONSES TO PHQ QUESTIONS 1-9: 0
2. FEELING DOWN, DEPRESSED OR HOPELESS: 0

## 2021-03-01 NOTE — PATIENT INSTRUCTIONS
Stopping Smoking: Care Instructions  Your Care Instructions     Cigarette smokers crave the nicotine in cigarettes. Giving it up is much harder than simply changing a habit. Your body has to stop craving the nicotine. It is hard to quit, but you can do it. There are many tools that people use to quit smoking. You may find that combining tools works best for you. There are several steps to quitting. First you get ready to quit. Then you get support to help you. After that, you learn new skills and behaviors to become a nonsmoker. For many people, a necessary step is getting and using medicine. Your doctor will help you set up the plan that best meets your needs. You may want to attend a smoking cessation program to help you quit smoking. When you choose a program, look for one that has proven success. Ask your doctor for ideas. You will greatly increase your chances of success if you take medicine as well as get counseling or join a cessation program.  Some of the changes you feel when you first quit tobacco are uncomfortable. Your body will miss the nicotine at first, and you may feel short-tempered and grumpy. You may have trouble sleeping or concentrating. Medicine can help you deal with these symptoms. You may struggle with changing your smoking habits and rituals. The last step is the tricky one: Be prepared for the smoking urge to continue for a time. This is a lot to deal with, but keep at it. You will feel better. Follow-up care is a key part of your treatment and safety. Be sure to make and go to all appointments, and call your doctor if you are having problems. It's also a good idea to know your test results and keep a list of the medicines you take. How can you care for yourself at home? · Ask your family, friends, and coworkers for support. You have a better chance of quitting if you have help and support. · Join a support group, such as Nicotine Anonymous, for people who are trying to quit smoking. · Consider signing up for a smoking cessation program, such as the American Lung Association's Freedom from Smoking program.  · Get text messaging support. Go to the website at www.smokefree. gov to sign up for the [facility] program.  · Set a quit date. Pick your date carefully so that it is not right in the middle of a big deadline or stressful time. Once you quit, do not even take a puff. Get rid of all ashtrays and lighters after your last cigarette. Clean your house and your clothes so that they do not smell of smoke. · Learn how to be a nonsmoker. Think about ways you can avoid those things that make you reach for a cigarette. ? Avoid situations that put you at greatest risk for smoking. For some people, it is hard to have a drink with friends without smoking. For others, they might skip a coffee break with coworkers who smoke. ? Change your daily routine. Take a different route to work or eat a meal in a different place. · Cut down on stress. Calm yourself or release tension by doing an activity you enjoy, such as reading a book, taking a hot bath, or gardening. · Talk to your doctor or pharmacist about nicotine replacement therapy, which replaces the nicotine in your body. You still get nicotine but you do not use tobacco. Nicotine replacement products help you slowly reduce the amount of nicotine you need. These products come in several forms, many of them available over-the-counter:  ? Nicotine patches  ? Nicotine gum and lozenges  ? Nicotine inhaler  · Ask your doctor about bupropion (Wellbutrin) or varenicline (Chantix), which are prescription medicines. They do not contain nicotine. They help you by reducing withdrawal symptoms, such as stress and anxiety. · Some people find hypnosis, acupuncture, and massage helpful for ending the smoking habit. · Eat a healthy diet and get regular exercise. Having healthy habits will help your body move past its craving for nicotine. · Be prepared to keep trying. Most people are not successful the first few times they try to quit. Do not get mad at yourself if you smoke again. Make a list of things you learned and think about when you want to try again, such as next week, next month, or next year. Where can you learn more? Go to https://[employer]. org and sign in to your [employer] account. Enter Q491 in the [employer] box to learn more about \"Stopping Smoking: Care Instructions. \"     If you do not have an account, please click on the \"Sign Up Now\" link. Current as of: March 12, 2020               Content Version: 12.6  © 2030-5339 [employer]. Care instructions adapted under license by [facility] ([facility]). If you have questions about a medical condition or this instruction, always ask your healthcare professional. [place] [address] any warranty or liability for your use of this information.

## 2021-03-02 ASSESSMENT — ENCOUNTER SYMPTOMS
SHORTNESS OF BREATH: 0
CONSTIPATION: 0
NAUSEA: 0
DIARRHEA: 0
BLOOD IN STOOL: 0
ABDOMINAL DISTENTION: 0
BACK PAIN: 0
TROUBLE SWALLOWING: 0
WHEEZING: 0
COUGH: 0
ABDOMINAL PAIN: 0
RHINORRHEA: 0
CHEST TIGHTNESS: 0
VOMITING: 0
SINUS PRESSURE: 0

## 2021-03-26 ENCOUNTER — OFFICE VISIT (OUTPATIENT)
Dept: NEUROLOGY | Age: 31
End: 2021-03-26
Payer: COMMERCIAL

## 2021-03-26 VITALS
BODY MASS INDEX: 13.3 KG/M2 | SYSTOLIC BLOOD PRESSURE: 111 MMHG | WEIGHT: 95 LBS | TEMPERATURE: 98.1 F | DIASTOLIC BLOOD PRESSURE: 60 MMHG | HEART RATE: 82 BPM | HEIGHT: 71 IN

## 2021-03-26 DIAGNOSIS — G40.909 SEIZURE DISORDER (HCC): ICD-10-CM

## 2021-03-26 DIAGNOSIS — G40.219 PARTIAL SYMPTOMATIC EPILEPSY WITH COMPLEX PARTIAL SEIZURES, INTRACTABLE, WITHOUT STATUS EPILEPTICUS (HCC): Primary | ICD-10-CM

## 2021-03-26 PROCEDURE — 99245 OFF/OP CONSLTJ NEW/EST HI 55: CPT | Performed by: PSYCHIATRY & NEUROLOGY

## 2021-03-26 PROCEDURE — G8427 DOCREV CUR MEDS BY ELIG CLIN: HCPCS | Performed by: PSYCHIATRY & NEUROLOGY

## 2021-03-26 PROCEDURE — G8419 CALC BMI OUT NRM PARAM NOF/U: HCPCS | Performed by: PSYCHIATRY & NEUROLOGY

## 2021-03-26 PROCEDURE — G8484 FLU IMMUNIZE NO ADMIN: HCPCS | Performed by: PSYCHIATRY & NEUROLOGY

## 2021-03-26 RX ORDER — LEVETIRACETAM 750 MG/1
TABLET ORAL
Qty: 120 TABLET | Refills: 0 | Status: SHIPPED | OUTPATIENT
Start: 2021-03-26 | End: 2021-04-28 | Stop reason: SDUPTHER

## 2021-03-26 NOTE — PROGRESS NOTES
NEUROLOGY CONSULTATION     Chief Complaint   Patient presents with    New Patient     Jacque Quinones MD / seizure       HISTORY OF PRESENT ILLNESS :    Robbin Vogt is a 32 y.o. female who is referred by Dr. Lilly Singer   History was obtained from patient  Patient was referred for evaluation of uncontrolled seizures. Patient started having seizures when she was 6years old. She lived for a long time in [address] [place] [address]. She apparently was followed by a neurologist there. She was on Dilantin initially and then switched to [address]. Patient states that most of the time she has been taking her Keppra regularly but there are occasions when she would come to the emergency room with a breakthrough seizure and a Keppra level would be undetectable. She will be loaded with IV Keppra and sent back. She then was admitted to [facility] in December 2020. Again Keppra level was subtherapeutic. She had an MRI brain which did not show any abnormalities. EEG was normal as well. Subsequently because of frequent seizures on Keppra dose has been increased and now she is taking Keppra 1000 mg in the morning and 1500 mg at night. Her primary care physician was planning to increase her dose to 2000 mg in the morning and 3000 mg at night but patient apparently was not aware of this and still has been taking only 2500 mg in total.  Patient admits to being under increased stress since that she is  from her children. Patient states that she feels cold all the time.     REVIEW OF SYSTEMS    Constitutional:  []   Chills   []  Fatigue   []  Fevers   []  Malaise   []  Weight loss     [x] Denies all of the above    Eyes:  []  Double vision   []  Blurry vision     [x] Denies all of the above    Ears, nose, mouth, throat, and face:   [] Hearing loss    []   Hoarseness      []  Snoring    []  Tinnitus       [x] Denies all of the above Respiratory:   []  Cough    []  Shortness of breath         [x] Denies all of the above     Cardiovascular:   []  Chest pain    []  Exertional chest pressure/discomfort           [] Palpitations    []  Syncope     [x] Denies all of the above    Gastrointestinal:   []  Abdominal pain   []  Constipation    []  Diarrhea    []   Dysphagia                      [x] Denies all of the above    Genitourinary:      []  Frequency   []  Hematuria     []  Urinary incontinence           [x] Denies all of the above     Hematologic/lymphatic:  []  Bleeding    []  Easy bruising   []  Anemia  [x] Denies all of the above     Musculoskeletal:   [x] Back pain       []  Myalgias    [x]  Neck pain           [] Denies all of the above    Neurological: As noted in HPI    Behavioral/Psych:   [] Anxiety    []  Depression     []  Mood swings     [x] Denies all of the above     Endocrine:   []  Temperature intolerance     [] Fatigue      [x] Denies all of the above     Allergic/Immunologic:   [] Hay fever    [x] Denies all of the above     Past Medical History:   Diagnosis Date    [facility]     Onset at 6years old     No family history on file.   Social History     Socioeconomic History    Marital status: Single     Spouse name: Not on file    Number of children: Not on file    Years of education: Not on file    Highest education level: Not on file   Occupational History    Not on file   Social Needs    Financial resource strain: Not on file    Food insecurity     Worry: Not on file     Inability: Not on file    Transportation needs     Medical: Not on file     Non-medical: Not on file   Tobacco Use    Smoking status: Current Every Day Smoker     Packs/day: 0.25    Smokeless tobacco: Never Used   Substance and Sexual Activity    Alcohol use: Not Currently     Frequency: Never    Drug use: Not on file    Sexual activity: Not on file   Lifestyle    Physical activity     Days per week: Not on file     Minutes per session: Not on file    Stress: Not on file   Relationships    Social connections     Talks on phone: Not on file     Gets together: Not on file     Attends Religion service: Not on file     Active member of club or organization: Not on file     Attends meetings of clubs or organizations: Not on file     Relationship status: Not on file    Intimate partner violence     Fear of current or ex partner: Not on file     Emotionally abused: Not on file     Physically abused: Not on file     Forced sexual activity: Not on file   Other Topics Concern    Not on file   Social History Narrative    Not on file       PHYSICAL EXAMINATION:  /60   Pulse 82   Temp 98.1 °F (36.7 °C)   Ht 5' 11\" (1.803 m)   Wt 95 lb (43.1 kg)   BMI 13.25 kg/m²   Appearance: Well appearing, well nourished and in no distress  Mental Status Exam: Patient is alert, oriented to person, place and time. Recent and remote memory is normal  Fund of Knowledge is normal  Attention/concentration is normal.   Speech : No dysarthria  Language : No aphasia  Funduscopic Exam: sharp disc margins  Cranial Nerves:   II: Visual fields:  Full to confrontation  III: Pupils:  equal, round, reactive to light  III,IV,VI: Extra Ocular Movements are intact. No nystagmus  V: Facial sensation is intact to pin prick and light touch  VII: Facial strength and movements: intact and symmetric smile,cheek puffing and eyebrow elevation  VIII: Hearing:  Intact to finger rub bilaterally  IX: Palate  elevation is symmetric  XI: Shoulder shrug is intact  XII: Tongue movements are normal  Motor:  Muscle tone and bulk are normal.   Strength is symmetrical 5/5 in all four extremities. Sensory: Intact to light touch and  pin prick in all four extremities  Coordination:  Normal  Finger to Nose and Heel to Shin bilaterally    . Reflexes:  DTR +2 and symmetric bilaterally  Plantar response: Flexor bilaterally  Gait: Gait and station is normal. Patient can toe/ heel and tandem walk without difficulty  Romberg: negative  Vascular: No carotid bruit bilaterally        DATA:  LABS:  General Labs:    CBC:   Lab Results   Component Value Date    WBC 7.5 01/27/2021    RBC 4.07 01/27/2021    HGB 13.0 01/27/2021    HCT 39.8 01/27/2021    MCV 97.7 01/27/2021    MCH 32.0 01/27/2021    MCHC 32.8 01/27/2021    RDW 13.1 01/27/2021     01/27/2021    MPV 9.0 01/27/2021     BMP:    Lab Results   Component Value Date     01/27/2021    K 3.9 01/27/2021     01/27/2021    CO2 24 01/27/2021    BUN 8 01/27/2021    LABALBU 4.0 01/27/2021    CREATININE 0.7 01/27/2021    CALCIUM 8.9 01/27/2021    GFRAA >60 01/27/2021    LABGLOM >60 01/27/2021    GLUCOSE 103 01/27/2021     RADIOLOGY REVIEW:  I have reviewed radiology report(s) of: MRI brain    IMPRESSION :  Intractable seizure disorder  Stress medically related symptoms and there may be some element of nonepileptic seizures as well. There has been some confusion about her Keppra dose. According to her primary care physician's note she should be taking 5000 mg daily but patient has been taking only 2500 mg. This may explain why her Keppra level is only 6.2. Extensive medical records from [facility] were reviewed. Her primary care physician's notes were reviewed as well. RECOMMENDATIONS :  Discussed at length with patient  Explained driving restrictions for patient with seizures in the [facility]  Recommended that she not drive an automobile for 3 months after the last seizure. I will increase her Keppra dose slowly. She is currently taking 2500 mg daily. I will increase it to 1500 mg twice daily for a total of 3000 mg and recheck her Keppra level in 2 weeks. I will see her in a month for follow-up. Please note a portion of this chart was generated using dragon dictation software. Although every effort was made to ensure the accuracy of this automated transcription, some errors in transcription may have occurred.

## 2021-04-28 ENCOUNTER — OFFICE VISIT (OUTPATIENT)
Dept: NEUROLOGY | Age: 31
End: 2021-04-28
Payer: COMMERCIAL

## 2021-04-28 VITALS
WEIGHT: 95 LBS | DIASTOLIC BLOOD PRESSURE: 65 MMHG | SYSTOLIC BLOOD PRESSURE: 95 MMHG | HEART RATE: 83 BPM | HEIGHT: 71 IN | BODY MASS INDEX: 13.3 KG/M2

## 2021-04-28 DIAGNOSIS — G40.919 BREAKTHROUGH SEIZURE (HCC): ICD-10-CM

## 2021-04-28 DIAGNOSIS — G40.219 PARTIAL SYMPTOMATIC EPILEPSY WITH COMPLEX PARTIAL SEIZURES, INTRACTABLE, WITHOUT STATUS EPILEPTICUS (HCC): Primary | ICD-10-CM

## 2021-04-28 PROCEDURE — 99214 OFFICE O/P EST MOD 30 MIN: CPT | Performed by: PSYCHIATRY & NEUROLOGY

## 2021-04-28 PROCEDURE — G8427 DOCREV CUR MEDS BY ELIG CLIN: HCPCS | Performed by: PSYCHIATRY & NEUROLOGY

## 2021-04-28 PROCEDURE — 4004F PT TOBACCO SCREEN RCVD TLK: CPT | Performed by: PSYCHIATRY & NEUROLOGY

## 2021-04-28 PROCEDURE — G8419 CALC BMI OUT NRM PARAM NOF/U: HCPCS | Performed by: PSYCHIATRY & NEUROLOGY

## 2021-04-28 RX ORDER — LEVETIRACETAM 750 MG/1
TABLET ORAL
Qty: 120 TABLET | Refills: 0 | Status: SHIPPED | OUTPATIENT
Start: 2021-04-28 | End: 2021-05-26 | Stop reason: SDUPTHER

## 2021-04-28 NOTE — PROGRESS NOTES
Chrys Libman   Neurology followup    Subjective:   CC/HP  History was obtained from the patient. Patient states that she has had a breakthrough seizure a few days ago. Patient has known uncontrolled seizures. Patient started having seizures when she was 6years old. She lived for a long time in [address] [place] [address]. She apparently was followed by a neurologist there. She was on Dilantin initially and then switched to [address]. Patient states that most of the time she has been taking her Keppra regularly but there are occasions when she would come to the emergency room with a breakthrough seizure and a Keppra level would be undetectable. She will be loaded with IV Keppra and sent back. She then was admitted to [facility] in December 2020. Again Keppra level was subtherapeutic. She had an MRI brain which did not show any abnormalities. EEG was normal as well. Subsequently because of frequent seizures on Keppra dose has been increased and now she is taking Keppra  1500 mg twice daily  Patient admits to being under increased stress since that she is  from her children. Patient states that she feels cold all the time    REVIEW OF SYSTEMS    Constitutional:  []   Chills   [x]  Fatigue   []  Fevers   []  Malaise   []  Weight loss     [] Denies all of the above    Respiratory:   [x]  Cough    [x]  Shortness of breath         [] Denies all of the above     Cardiovascular:   [x]  Chest pain    [x]  Exertional chest pressure/discomfort           [] Palpitations    []  Syncope     [] Denies all of the above        Past Medical History:   Diagnosis Date    Seizures ([address]     Onset at 6years old     No family history on file.   Social History     Socioeconomic History    Marital status: Single     Spouse name: Not on file    Number of children: Not on file    Years of education: Not on file    Highest education level: Not on file   Occupational History    Not on file   Social Needs    Financial resource strain: Not on file    Food insecurity     Worry: Not on file     Inability: Not on file    Transportation needs     Medical: Not on file     Non-medical: Not on file   Tobacco Use    Smoking status: Current Every Day Smoker     Packs/day: 0.25    Smokeless tobacco: Never Used   Substance and Sexual Activity    Alcohol use: Not Currently     Frequency: Never    Drug use: Not on file    Sexual activity: Not on file   Lifestyle    Physical activity     Days per week: Not on file     Minutes per session: Not on file    Stress: Not on file   Relationships    Social connections     Talks on phone: Not on file     Gets together: Not on file     Attends Cheondoism service: Not on file     Active member of club or organization: Not on file     Attends meetings of clubs or organizations: Not on file     Relationship status: Not on file    Intimate partner violence     Fear of current or ex partner: Not on file     Emotionally abused: Not on file     Physically abused: Not on file     Forced sexual activity: Not on file   Other Topics Concern    Not on file   Social History Narrative    Not on file        Objective:  Exam:  BP 95/65   Pulse 83   Ht 5' 11\" (1.803 m)   Wt 95 lb (43.1 kg)   BMI 13.25 kg/m²   This is a well-nourished patient in no acute distress  Patient is awake, alert and oriented x3. Speech is normal.  Pupils are equal round reacting to light. Extraocular movements intact. Face symmetrical. Tongue midline. Motor exam shows normal symmetrical strength. Deep tendon reflexes normal. Plantar reflexes downgoing. Sensory exam normal. Coordination normal. Gait normal. No carotid bruit. No neck stiffness.       Data :  LABS:  General Labs:    CBC:   Lab Results   Component Value Date    WBC 7.5 01/27/2021    RBC 4.07 01/27/2021    HGB 13.0 01/27/2021    HCT 39.8 01/27/2021    MCV 97.7 01/27/2021    MCH 32.0 01/27/2021    MCHC 32.8 01/27/2021    RDW 13.1 01/27/2021

## 2021-04-30 ENCOUNTER — HOSPITAL ENCOUNTER (OUTPATIENT)
Age: 31
Discharge: HOME | End: 2021-04-30
Payer: COMMERCIAL

## 2021-04-30 DIAGNOSIS — G40.219 PARTIAL SYMPTOMATIC EPILEPSY WITH COMPLEX PARTIAL SEIZURES, INTRACTABLE, WITHOUT STATUS EPILEPTICUS (HCC): ICD-10-CM

## 2021-04-30 LAB
KEPPRA DOSE AMT: NORMAL
KEPPRA: 6.9 UG/ML (ref 6–46)

## 2021-04-30 PROCEDURE — 80177 DRUG SCRN QUAN LEVETIRACETAM: CPT

## 2021-05-26 ENCOUNTER — OFFICE VISIT (OUTPATIENT)
Dept: NEUROLOGY | Age: 31
End: 2021-05-26
Payer: COMMERCIAL

## 2021-05-26 VITALS
RESPIRATION RATE: 14 BRPM | BODY MASS INDEX: 16.8 KG/M2 | WEIGHT: 120 LBS | HEART RATE: 91 BPM | SYSTOLIC BLOOD PRESSURE: 108 MMHG | DIASTOLIC BLOOD PRESSURE: 72 MMHG | HEIGHT: 71 IN | TEMPERATURE: 98 F

## 2021-05-26 DIAGNOSIS — G40.219 PARTIAL SYMPTOMATIC EPILEPSY WITH COMPLEX PARTIAL SEIZURES, INTRACTABLE, WITHOUT STATUS EPILEPTICUS (HCC): Primary | ICD-10-CM

## 2021-05-26 PROCEDURE — G8427 DOCREV CUR MEDS BY ELIG CLIN: HCPCS | Performed by: PSYCHIATRY & NEUROLOGY

## 2021-05-26 PROCEDURE — 99213 OFFICE O/P EST LOW 20 MIN: CPT | Performed by: PSYCHIATRY & NEUROLOGY

## 2021-05-26 PROCEDURE — 4004F PT TOBACCO SCREEN RCVD TLK: CPT | Performed by: PSYCHIATRY & NEUROLOGY

## 2021-05-26 PROCEDURE — G8419 CALC BMI OUT NRM PARAM NOF/U: HCPCS | Performed by: PSYCHIATRY & NEUROLOGY

## 2021-05-26 RX ORDER — LEVETIRACETAM 750 MG/1
TABLET ORAL
Qty: 360 TABLET | Refills: 1 | Status: SHIPPED | OUTPATIENT
Start: 2021-05-26 | End: 2021-10-06 | Stop reason: SDUPTHER

## 2021-05-26 NOTE — LETTER
[facility]  [address] [place] [address] [id number]  [id number]  Phone: [phone]  Fax: [phone]    Alex Emery MD    May 26, 2021     [address] [facility] [address] [address]    Patient: Hammad Theodore   MR Number: [id number]   YOB: 1990   Date of Visit: 5/26/2021       Dear Lina Trevino: Thank you for referring Hammad Theodore to me for evaluation/treatment. Below are the relevant portions of my assessment and plan of care. If you have questions, please do not hesitate to call me. I look forward to following Bekah Gutierrez along with you.     Sincerely,    MD Alex Blanco MD

## 2021-05-26 NOTE — PROGRESS NOTES
Wu Figures   Neurology followup    Subjective:   CC/HP  History was obtained from the patient. Patient stated that she has not had a seizure in a couple of months now. She has been taking her medications regularly. Detailed history:. Patient has known uncontrolled seizures. Patient started having seizures when she was 6years old. She lived for a long time in [address] [place] [address]. She apparently was followed by a neurologist there. She was on Dilantin initially and then switched to [address]. Patient states that most of the time she has been taking her Keppra regularly but there are occasions when she would come to the emergency room with a breakthrough seizure and a Keppra level would be undetectable. She will be loaded with IV Keppra and sent back. She then was admitted to [facility] in December 2020. Again Keppra level was subtherapeutic. She had an MRI brain which did not show any abnormalities. EEG was normal as well. Subsequently because of frequent seizures on Keppra dose has been increased and now she is taking Keppra  1500 mg twice daily  Patient admits to being under increased stress since that she is  from her children. Patient states that she feels cold all the time    REVIEW OF SYSTEMS    Constitutional:  []   Chills   [x]  Fatigue   []  Fevers   []  Malaise   []  Weight loss     [] Denies all of the above    Respiratory:   [x]  Cough    [x]  Shortness of breath         [] Denies all of the above     Cardiovascular:   [x]  Chest pain    [x]  Exertional chest pressure/discomfort           [] Palpitations    []  Syncope     [] Denies all of the above        Past Medical History:   Diagnosis Date    Seizures ([address]     Onset at 6years old     History reviewed. No pertinent family history.   Social History     Socioeconomic History    Marital status: Single     Spouse name: None    Number of children: None    Years of education: None    Highest education level: None Occupational History    None   Tobacco Use    Smoking status: Current Every Day Smoker     Packs/day: 0.25     Years: 10.00     Pack years: 2.50    Smokeless tobacco: Never Used   Substance and Sexual Activity    Alcohol use: Not Currently    Drug use: Never    Sexual activity: None   Other Topics Concern    None   Social History Narrative    None     Social Determinants of Health     Financial Resource Strain:     Difficulty of Paying Living Expenses:    Food Insecurity:     Worried About Running Out of Food in the Last Year:     Ran Out of Food in the Last Year:    Transportation Needs:     Lack of Transportation (Medical):  Lack of Transportation (Non-Medical):    Physical Activity:     Days of Exercise per Week:     Minutes of Exercise per Session:    Stress:     Feeling of Stress :    Social Connections:     Frequency of Communication with Friends and Family:     Frequency of Social Gatherings with Friends and Family:     Attends Cheondoism Services:     Active Member of Clubs or Organizations:     Attends Club or Organization Meetings:     Marital Status:    Intimate Partner Violence:     Fear of Current or Ex-Partner:     Emotionally Abused:     Physically Abused:     Sexually Abused:         Objective:  Exam:  /72   Pulse 91   Temp 98 °F (36.7 °C)   Resp 14   Ht 5' 11\" (1.803 m)   Wt 120 lb (54.4 kg)   BMI 16.74 kg/m²   This is a well-nourished patient in no acute distress  Patient is awake, alert and oriented x3. Speech is normal.  Pupils are equal round reacting to light. Extraocular movements intact. Face symmetrical. Tongue midline. Motor exam shows normal symmetrical strength. Deep tendon reflexes normal. Plantar reflexes downgoing. Sensory exam normal. Coordination normal. Gait normal. No carotid bruit. No neck stiffness.       Data :  LABS:  General Labs:    CBC:   Lab Results   Component Value Date    WBC 7.5 01/27/2021    RBC 4.07 01/27/2021    HGB 13.0

## 2021-05-28 ENCOUNTER — APPOINTMENT (OUTPATIENT)
Dept: CT IMAGING | Age: 31
DRG: 053 | End: 2021-05-28
Payer: COMMERCIAL

## 2021-05-28 ENCOUNTER — APPOINTMENT (OUTPATIENT)
Dept: GENERAL RADIOLOGY | Age: 31
DRG: 053 | End: 2021-05-28
Payer: COMMERCIAL

## 2021-05-28 ENCOUNTER — HOSPITAL ENCOUNTER (INPATIENT)
Age: 31
LOS: 1 days | Discharge: LEFT BEFORE DISCHARGE | DRG: 053 | End: 2021-05-29
Attending: EMERGENCY MEDICINE | Admitting: INTERNAL MEDICINE
Payer: COMMERCIAL

## 2021-05-28 DIAGNOSIS — R56.9 SEIZURE (HCC): Primary | ICD-10-CM

## 2021-05-28 LAB
A/G RATIO: 1.7 (ref 1.1–2.2)
ALBUMIN SERPL-MCNC: 4 G/DL (ref 3.4–5)
ALP BLD-CCNC: 86 U/L (ref 40–129)
ALT SERPL-CCNC: 19 U/L (ref 10–40)
ANION GAP SERPL CALCULATED.3IONS-SCNC: 8 MMOL/L (ref 3–16)
AST SERPL-CCNC: 24 U/L (ref 15–37)
BASOPHILS ABSOLUTE: 0 K/UL (ref 0–0.2)
BASOPHILS RELATIVE PERCENT: 0.5 %
BILIRUB SERPL-MCNC: 0.5 MG/DL (ref 0–1)
BUN BLDV-MCNC: 7 MG/DL (ref 7–20)
CALCIUM SERPL-MCNC: 9.1 MG/DL (ref 8.3–10.6)
CHLORIDE BLD-SCNC: 104 MMOL/L (ref 99–110)
CO2: 25 MMOL/L (ref 21–32)
CREAT SERPL-MCNC: 0.7 MG/DL (ref 0.6–1.1)
EOSINOPHILS ABSOLUTE: 0.3 K/UL (ref 0–0.6)
EOSINOPHILS RELATIVE PERCENT: 4.6 %
GFR AFRICAN AMERICAN: >60
GFR NON-AFRICAN AMERICAN: >60
GLOBULIN: 2.3 G/DL
GLUCOSE BLD-MCNC: 84 MG/DL (ref 70–99)
HCG QUALITATIVE: NEGATIVE
HCT VFR BLD CALC: 41.1 % (ref 36–48)
HEMOGLOBIN: 13.9 G/DL (ref 12–16)
LYMPHOCYTES ABSOLUTE: 2 K/UL (ref 1–5.1)
LYMPHOCYTES RELATIVE PERCENT: 27.7 %
MCH RBC QN AUTO: 32.1 PG (ref 26–34)
MCHC RBC AUTO-ENTMCNC: 33.8 G/DL (ref 31–36)
MCV RBC AUTO: 95 FL (ref 80–100)
MONOCYTES ABSOLUTE: 0.4 K/UL (ref 0–1.3)
MONOCYTES RELATIVE PERCENT: 5.6 %
NEUTROPHILS ABSOLUTE: 4.5 K/UL (ref 1.7–7.7)
NEUTROPHILS RELATIVE PERCENT: 61.6 %
PDW BLD-RTO: 14.3 % (ref 12.4–15.4)
PLATELET # BLD: 172 K/UL (ref 135–450)
PMV BLD AUTO: 9 FL (ref 5–10.5)
POTASSIUM REFLEX MAGNESIUM: 4.6 MMOL/L (ref 3.5–5.1)
RBC # BLD: 4.32 M/UL (ref 4–5.2)
SODIUM BLD-SCNC: 137 MMOL/L (ref 136–145)
TOTAL PROTEIN: 6.3 G/DL (ref 6.4–8.2)
TROPONIN: <0.01 NG/ML
WBC # BLD: 7.4 K/UL (ref 4–11)

## 2021-05-28 PROCEDURE — 85025 COMPLETE CBC W/AUTO DIFF WBC: CPT

## 2021-05-28 PROCEDURE — 80053 COMPREHEN METABOLIC PANEL: CPT

## 2021-05-28 PROCEDURE — 84703 CHORIONIC GONADOTROPIN ASSAY: CPT

## 2021-05-28 PROCEDURE — 96374 THER/PROPH/DIAG INJ IV PUSH: CPT

## 2021-05-28 PROCEDURE — 80177 DRUG SCRN QUAN LEVETIRACETAM: CPT

## 2021-05-28 PROCEDURE — 81003 URINALYSIS AUTO W/O SCOPE: CPT

## 2021-05-28 PROCEDURE — 84484 ASSAY OF TROPONIN QUANT: CPT

## 2021-05-28 PROCEDURE — 70450 CT HEAD/BRAIN W/O DYE: CPT

## 2021-05-28 PROCEDURE — 71045 X-RAY EXAM CHEST 1 VIEW: CPT

## 2021-05-28 PROCEDURE — 6360000002 HC RX W HCPCS: Performed by: PHYSICIAN ASSISTANT

## 2021-05-28 RX ORDER — LORAZEPAM 2 MG/ML
0.5 INJECTION INTRAMUSCULAR ONCE
Status: COMPLETED | OUTPATIENT
Start: 2021-05-28 | End: 2021-05-28

## 2021-05-28 RX ADMIN — LORAZEPAM 0.5 MG: 2 INJECTION INTRAMUSCULAR; INTRAVENOUS at 22:55

## 2021-05-28 ASSESSMENT — ENCOUNTER SYMPTOMS
RESPIRATORY NEGATIVE: 1
CONSTIPATION: 0
SHORTNESS OF BREATH: 0
BACK PAIN: 0
NAUSEA: 0
CHEST TIGHTNESS: 0
PHOTOPHOBIA: 0
ABDOMINAL PAIN: 0
COLOR CHANGE: 0
COUGH: 0
VOMITING: 0
DIARRHEA: 0

## 2021-05-29 ENCOUNTER — APPOINTMENT (OUTPATIENT)
Dept: MRI IMAGING | Age: 31
DRG: 053 | End: 2021-05-29
Payer: COMMERCIAL

## 2021-05-29 VITALS
HEIGHT: 69 IN | TEMPERATURE: 98.8 F | WEIGHT: 119.4 LBS | SYSTOLIC BLOOD PRESSURE: 96 MMHG | HEART RATE: 80 BPM | RESPIRATION RATE: 17 BRPM | OXYGEN SATURATION: 95 % | BODY MASS INDEX: 17.68 KG/M2 | DIASTOLIC BLOOD PRESSURE: 67 MMHG

## 2021-05-29 PROBLEM — R56.9 SEIZURES (HCC): Status: ACTIVE | Noted: 2021-05-29

## 2021-05-29 PROBLEM — R00.1 BRADYCARDIA: Status: ACTIVE | Noted: 2021-05-29

## 2021-05-29 PROBLEM — N39.0 UTI (URINARY TRACT INFECTION): Status: ACTIVE | Noted: 2021-05-29

## 2021-05-29 PROBLEM — Z72.0 TOBACCO ABUSE: Status: ACTIVE | Noted: 2021-05-29

## 2021-05-29 LAB
A/G RATIO: 1.7 (ref 1.1–2.2)
ALBUMIN SERPL-MCNC: 3.6 G/DL (ref 3.4–5)
ALP BLD-CCNC: 78 U/L (ref 40–129)
ALT SERPL-CCNC: 16 U/L (ref 10–40)
ANION GAP SERPL CALCULATED.3IONS-SCNC: 9 MMOL/L (ref 3–16)
AST SERPL-CCNC: 26 U/L (ref 15–37)
BACTERIA: ABNORMAL /HPF
BASOPHILS ABSOLUTE: 0 K/UL (ref 0–0.2)
BASOPHILS RELATIVE PERCENT: 0.5 %
BILIRUB SERPL-MCNC: 0.5 MG/DL (ref 0–1)
BILIRUBIN URINE: NEGATIVE
BLOOD, URINE: NEGATIVE
BUN BLDV-MCNC: 8 MG/DL (ref 7–20)
CALCIUM SERPL-MCNC: 8.8 MG/DL (ref 8.3–10.6)
CHLORIDE BLD-SCNC: 105 MMOL/L (ref 99–110)
CHOLESTEROL, TOTAL: 132 MG/DL (ref 0–199)
CLARITY: ABNORMAL
CO2: 23 MMOL/L (ref 21–32)
COLOR: YELLOW
CREAT SERPL-MCNC: 0.7 MG/DL (ref 0.6–1.1)
EKG ATRIAL RATE: 77 BPM
EKG DIAGNOSIS: NORMAL
EKG P AXIS: 71 DEGREES
EKG P-R INTERVAL: 148 MS
EKG Q-T INTERVAL: 378 MS
EKG QRS DURATION: 82 MS
EKG QTC CALCULATION (BAZETT): 427 MS
EKG R AXIS: 90 DEGREES
EKG T AXIS: 62 DEGREES
EKG VENTRICULAR RATE: 77 BPM
EOSINOPHILS ABSOLUTE: 0.4 K/UL (ref 0–0.6)
EOSINOPHILS RELATIVE PERCENT: 4.8 %
EPITHELIAL CELLS, UA: 3 /HPF (ref 0–5)
GFR AFRICAN AMERICAN: >60
GFR NON-AFRICAN AMERICAN: >60
GLOBULIN: 2.1 G/DL
GLUCOSE BLD-MCNC: 90 MG/DL (ref 70–99)
GLUCOSE URINE: NEGATIVE MG/DL
HCT VFR BLD CALC: 38.3 % (ref 36–48)
HDLC SERPL-MCNC: 34 MG/DL (ref 40–60)
HEMOGLOBIN: 12.7 G/DL (ref 12–16)
HYALINE CASTS: 4 /LPF (ref 0–8)
KEPPRA DOSE AMT: ABNORMAL
KEPPRA: <2 UG/ML (ref 6–46)
KETONES, URINE: NEGATIVE MG/DL
LDL CHOLESTEROL CALCULATED: 86 MG/DL
LEUKOCYTE ESTERASE, URINE: ABNORMAL
LYMPHOCYTES ABSOLUTE: 2.5 K/UL (ref 1–5.1)
LYMPHOCYTES RELATIVE PERCENT: 32.6 %
MCH RBC QN AUTO: 31.7 PG (ref 26–34)
MCHC RBC AUTO-ENTMCNC: 33.2 G/DL (ref 31–36)
MCV RBC AUTO: 95.5 FL (ref 80–100)
MICROSCOPIC EXAMINATION: YES
MONOCYTES ABSOLUTE: 0.4 K/UL (ref 0–1.3)
MONOCYTES RELATIVE PERCENT: 4.7 %
NEUTROPHILS ABSOLUTE: 4.4 K/UL (ref 1.7–7.7)
NEUTROPHILS RELATIVE PERCENT: 57.4 %
NITRITE, URINE: POSITIVE
PDW BLD-RTO: 14.4 % (ref 12.4–15.4)
PH UA: 6.5 (ref 5–8)
PLATELET # BLD: 163 K/UL (ref 135–450)
PMV BLD AUTO: 9.1 FL (ref 5–10.5)
POTASSIUM REFLEX MAGNESIUM: 4.3 MMOL/L (ref 3.5–5.1)
PROTEIN UA: NEGATIVE MG/DL
RBC # BLD: 4.01 M/UL (ref 4–5.2)
RBC UA: 0 /HPF (ref 0–4)
SODIUM BLD-SCNC: 137 MMOL/L (ref 136–145)
SPECIFIC GRAVITY UA: 1.01 (ref 1–1.03)
TOTAL CK: 153 U/L (ref 26–192)
TOTAL PROTEIN: 5.7 G/DL (ref 6.4–8.2)
TRIGL SERPL-MCNC: 59 MG/DL (ref 0–150)
URINE REFLEX TO CULTURE: ABNORMAL
URINE TYPE: ABNORMAL
UROBILINOGEN, URINE: 0.2 E.U./DL
VLDLC SERPL CALC-MCNC: 12 MG/DL
WBC # BLD: 7.7 K/UL (ref 4–11)
WBC UA: 6 /HPF (ref 0–5)

## 2021-05-29 PROCEDURE — 70551 MRI BRAIN STEM W/O DYE: CPT

## 2021-05-29 PROCEDURE — 2580000003 HC RX 258: Performed by: INTERNAL MEDICINE

## 2021-05-29 PROCEDURE — 83036 HEMOGLOBIN GLYCOSYLATED A1C: CPT

## 2021-05-29 PROCEDURE — 82550 ASSAY OF CK (CPK): CPT

## 2021-05-29 PROCEDURE — 85025 COMPLETE CBC W/AUTO DIFF WBC: CPT

## 2021-05-29 PROCEDURE — 6360000002 HC RX W HCPCS

## 2021-05-29 PROCEDURE — 99284 EMERGENCY DEPT VISIT MOD MDM: CPT

## 2021-05-29 PROCEDURE — 93010 ELECTROCARDIOGRAM REPORT: CPT | Performed by: INTERNAL MEDICINE

## 2021-05-29 PROCEDURE — 6370000000 HC RX 637 (ALT 250 FOR IP): Performed by: INTERNAL MEDICINE

## 2021-05-29 PROCEDURE — 93005 ELECTROCARDIOGRAM TRACING: CPT | Performed by: PHYSICIAN ASSISTANT

## 2021-05-29 PROCEDURE — 80053 COMPREHEN METABOLIC PANEL: CPT

## 2021-05-29 PROCEDURE — 80061 LIPID PANEL: CPT

## 2021-05-29 PROCEDURE — 6360000002 HC RX W HCPCS: Performed by: PHYSICIAN ASSISTANT

## 2021-05-29 PROCEDURE — 99223 1ST HOSP IP/OBS HIGH 75: CPT | Performed by: PSYCHIATRY & NEUROLOGY

## 2021-05-29 PROCEDURE — 2060000000 HC ICU INTERMEDIATE R&B

## 2021-05-29 PROCEDURE — 36415 COLL VENOUS BLD VENIPUNCTURE: CPT

## 2021-05-29 PROCEDURE — 6360000002 HC RX W HCPCS: Performed by: INTERNAL MEDICINE

## 2021-05-29 RX ORDER — LORAZEPAM 2 MG/ML
1 INJECTION INTRAMUSCULAR ONCE
Status: DISCONTINUED | OUTPATIENT
Start: 2021-05-29 | End: 2021-05-29

## 2021-05-29 RX ORDER — POTASSIUM CHLORIDE 7.45 MG/ML
10 INJECTION INTRAVENOUS PRN
Status: DISCONTINUED | OUTPATIENT
Start: 2021-05-29 | End: 2021-05-29 | Stop reason: HOSPADM

## 2021-05-29 RX ORDER — PROMETHAZINE HYDROCHLORIDE 25 MG/1
12.5 TABLET ORAL EVERY 6 HOURS PRN
Status: DISCONTINUED | OUTPATIENT
Start: 2021-05-29 | End: 2021-05-29 | Stop reason: HOSPADM

## 2021-05-29 RX ORDER — LEVETIRACETAM 500 MG/1
1500 TABLET ORAL 2 TIMES DAILY
Status: DISCONTINUED | OUTPATIENT
Start: 2021-05-29 | End: 2021-05-29 | Stop reason: HOSPADM

## 2021-05-29 RX ORDER — LEVETIRACETAM 10 MG/ML
1000 INJECTION INTRAVASCULAR EVERY 12 HOURS
Status: DISCONTINUED | OUTPATIENT
Start: 2021-05-29 | End: 2021-05-29 | Stop reason: HOSPADM

## 2021-05-29 RX ORDER — LORAZEPAM 2 MG/ML
INJECTION INTRAMUSCULAR
Status: COMPLETED
Start: 2021-05-29 | End: 2021-05-29

## 2021-05-29 RX ORDER — ONDANSETRON 2 MG/ML
4 INJECTION INTRAMUSCULAR; INTRAVENOUS EVERY 6 HOURS PRN
Status: DISCONTINUED | OUTPATIENT
Start: 2021-05-29 | End: 2021-05-29 | Stop reason: HOSPADM

## 2021-05-29 RX ORDER — ASPIRIN 81 MG/1
81 TABLET ORAL DAILY
Status: DISCONTINUED | OUTPATIENT
Start: 2021-05-29 | End: 2021-05-29 | Stop reason: HOSPADM

## 2021-05-29 RX ORDER — ASPIRIN 300 MG/1
300 SUPPOSITORY RECTAL DAILY
Status: DISCONTINUED | OUTPATIENT
Start: 2021-05-29 | End: 2021-05-29 | Stop reason: HOSPADM

## 2021-05-29 RX ORDER — SODIUM CHLORIDE 9 MG/ML
25 INJECTION, SOLUTION INTRAVENOUS PRN
Status: DISCONTINUED | OUTPATIENT
Start: 2021-05-29 | End: 2021-05-29 | Stop reason: HOSPADM

## 2021-05-29 RX ORDER — LORAZEPAM 0.5 MG/1
0.5 TABLET ORAL 3 TIMES DAILY
Qty: 9 TABLET | Refills: 0 | Status: SHIPPED | OUTPATIENT
Start: 2021-05-29 | End: 2021-06-01

## 2021-05-29 RX ORDER — 0.9 % SODIUM CHLORIDE 0.9 %
500 INTRAVENOUS SOLUTION INTRAVENOUS PRN
Status: DISCONTINUED | OUTPATIENT
Start: 2021-05-29 | End: 2021-05-29 | Stop reason: HOSPADM

## 2021-05-29 RX ORDER — SODIUM CHLORIDE 0.9 % (FLUSH) 0.9 %
10 SYRINGE (ML) INJECTION EVERY 12 HOURS SCHEDULED
Status: DISCONTINUED | OUTPATIENT
Start: 2021-05-29 | End: 2021-05-29 | Stop reason: HOSPADM

## 2021-05-29 RX ORDER — SODIUM CHLORIDE 0.9 % (FLUSH) 0.9 %
10 SYRINGE (ML) INJECTION PRN
Status: DISCONTINUED | OUTPATIENT
Start: 2021-05-29 | End: 2021-05-29 | Stop reason: HOSPADM

## 2021-05-29 RX ORDER — LABETALOL HYDROCHLORIDE 5 MG/ML
10 INJECTION, SOLUTION INTRAVENOUS EVERY 10 MIN PRN
Status: DISCONTINUED | OUTPATIENT
Start: 2021-05-29 | End: 2021-05-29 | Stop reason: HOSPADM

## 2021-05-29 RX ORDER — POTASSIUM CHLORIDE 20 MEQ/1
40 TABLET, EXTENDED RELEASE ORAL PRN
Status: DISCONTINUED | OUTPATIENT
Start: 2021-05-29 | End: 2021-05-29 | Stop reason: HOSPADM

## 2021-05-29 RX ORDER — HYDRALAZINE HYDROCHLORIDE 20 MG/ML
10 INJECTION INTRAMUSCULAR; INTRAVENOUS EVERY 6 HOURS PRN
Status: DISCONTINUED | OUTPATIENT
Start: 2021-05-29 | End: 2021-05-29 | Stop reason: HOSPADM

## 2021-05-29 RX ORDER — LORAZEPAM 2 MG/ML
1 INJECTION INTRAMUSCULAR
Status: DISCONTINUED | OUTPATIENT
Start: 2021-05-29 | End: 2021-05-29 | Stop reason: HOSPADM

## 2021-05-29 RX ADMIN — LORAZEPAM 2 MG: 2 INJECTION INTRAMUSCULAR; INTRAVENOUS at 01:34

## 2021-05-29 RX ADMIN — LEVETIRACETAM 1500 MG: 500 TABLET ORAL at 09:48

## 2021-05-29 RX ADMIN — Medication 1000 MG: at 09:47

## 2021-05-29 RX ADMIN — Medication 10 ML: at 09:50

## 2021-05-29 RX ADMIN — ASPIRIN 81 MG: 81 TABLET, COATED ORAL at 09:49

## 2021-05-29 RX ADMIN — LEVETIRACETAM 1000 MG: 10 INJECTION INTRAVENOUS at 01:41

## 2021-05-29 RX ADMIN — ENOXAPARIN SODIUM 40 MG: 40 INJECTION SUBCUTANEOUS at 09:48

## 2021-05-29 ASSESSMENT — ENCOUNTER SYMPTOMS
EYE PAIN: 0
NAUSEA: 0
COUGH: 0
EYE REDNESS: 0
VOMITING: 0
SHORTNESS OF BREATH: 0

## 2021-05-29 ASSESSMENT — PAIN SCALES - WONG BAKER
WONGBAKER_NUMERICALRESPONSE: 0
WONGBAKER_NUMERICALRESPONSE: 0

## 2021-05-29 ASSESSMENT — PAIN SCALES - GENERAL
PAINLEVEL_OUTOF10: 0
PAINLEVEL_OUTOF10: 0

## 2021-05-29 NOTE — PROGRESS NOTES
MRI checklist filled using the chart. Asked pt if she had any metal in her body she shook her head no. When asked about IUD she stated \" I don't know\" when asked about chemo or radiation she stated \" I don't Know\" but also shook her head no. Finally sat pt up and put the pen in her hand and asked her to sign next to the x and the pt was able to do that. Oncoming RN aware.    Cathryn Pierce RN

## 2021-05-29 NOTE — PROGRESS NOTES
Pt brought to floor by ER,RN significant other with patient. Pt is post ictal and has had 3-4mg of ativan in the ER for seizure activity. Unable to assess pt's neurological status or complete NIH and swallow screen at this time. MRI checklist printed and at the bedside. Pt unable to follow commands at this time. Will continue to monitor.   Kirk Balbuena RN

## 2021-05-29 NOTE — PROGRESS NOTES
Patient called out stating she is wanting to leave the hospital. Followed up with Clare Pandya on 5/29/2021 at 2:10 PM. Patient left the floor with a disposition of AMA on 5/29/2021 14:09. Patient cited personal reason. Peripheral IV removed with no complications. Tele box cleaned and returned. Advised patient to follow up with a primary care physician or return to the Emergency Department if symptoms worsen.    Tom Underwood RN

## 2021-05-29 NOTE — PROGRESS NOTES
Pt more alert now. Answering questions appropriately. Pt also drank 3oz of water and displayed no signs of choking or aspiration. Pt swallowed her pills whole with water with no complications. Upgraded pt to general diet.

## 2021-05-29 NOTE — CONSULTS
In patient Neurology consult        [facility]      MD Jose Carlos Colbert  1990    Date of Service: 5/29/2021    Referring Physician: Sanam Lemus MD      Reason for the consult and CC: Acute encephalopathy and breakthrough seizures. History was obtained from the patient and reviewing her records. I do not feel the patient is a good historian. She sees Dr. Cory Beltran for seizure management     HPI:   The patient is a 32y.o.  years old female with history of intractable epilepsy since childhood who was admitted to the hospital from the ED last night with breakthrough seizures. She described multiple seizures at home. Has no recollection of the semiology but according to her notes, could be generalized motor seizure. She had at least 2 or 3 before admission and another seizure in the ED. No aura or triggers. Degree is severe. Duration is minutes. She did have some postictal state but no tongue biting or bladder incontinence. No other associated symptoms, relieving or aggravating factors. No recent fever chills or head trauma. She used to have them infrequently however over the last several months has been having at least once every few weeks. She is on Keppra up to 1500 mg x 2. She has been taking her medicine daily however frequent blood test showed subtherapeutic Keppra level. Keppra level today was 2.0 which is very low. Unremarkable electrolytes except for UTI on UA. Patient was admitted to the hospital.  Today she denies any new symptoms. History of suicidal ideation or thoughts. She denies any drugs or drinking. CT followed by MRI brain showed no acute findings. Other review of system was unremarkable.     Family history: Noncontributory    Surgical  history: Noncontributory        Past Medical History:   Diagnosis Date    Seizures ([address]     Onset at 6years old     Social History     Tobacco Use    Smoking status: Current Every Day Smoker     Packs/day: 0.50     Years: 10.00     Pack years: 5.00    Smokeless tobacco: Never Used   Substance Use Topics    Alcohol use: Not Currently    Drug use: Never     No Known Allergies  Current Facility-Administered Medications   Medication Dose Route Frequency Provider Last Rate Last Admin    LORazepam (ATIVAN) injection 1 mg  1 mg Intravenous Q2H PRN Kiah Rios MD        sodium chloride flush 0.9 % injection 10 mL  10 mL Intravenous 2 times per day Kiah Rios MD   10 mL at 05/29/21 0950    sodium chloride flush 0.9 % injection 10 mL  10 mL Intravenous PRN Kiah Rios MD        0.9 % sodium chloride infusion  25 mL Intravenous PRN Kiah Rios MD        promethazine (PHENERGAN) tablet 12.5 mg  12.5 mg Oral Q6H PRN Kiah Rios MD        Or    ondansetron [facility]) injection 4 mg  4 mg Intravenous Q6H PRN Kiah Rios MD        magnesium hydroxide (MILK OF MAGNESIA) 400 MG/5ML suspension 30 mL  30 mL Oral Daily PRN Kiah Rios MD        enoxaparin (LOVENOX) injection 40 mg  40 mg Subcutaneous Daily Kiah Rios MD   40 mg at 05/29/21 0948    aspirin EC tablet 81 mg  81 mg Oral Daily Kiah Rios MD   81 mg at 05/29/21 0654    Or    aspirin suppository 300 mg  300 mg Rectal Daily Kiah Rios MD        labetalol (NORMODYNE;TRANDATE) injection 10 mg  10 mg Intravenous Q10 Min PRN Kiah Rios MD        hydrALAZINE (APRESOLINE) injection 10 mg  10 mg Intravenous Q6H PRN Kiah Rios MD        potassium chloride (KLOR-CON M) extended release tablet 40 mEq  40 mEq Oral PRN Kiah Rios MD        Or    potassium bicarb-citric acid (EFFER-K) effervescent tablet 40 mEq  40 mEq Oral PRN Kiah Rios MD        Or    potassium chloride 10 mEq/100 mL IVPB (Peripheral Line)  10 mEq Intravenous PRN Kiah Rios MD        0.9 % sodium chloride bolus  500 mL Intravenous PRN Kiah Rios MD        cefTRIAXone (ROCEPHIN) 1000 mg in sterile water 10 mL IV with FNF in upper extremities. Normal REM. Sensation: normal to all modalities in both arms and legs. Gait/Posture: Not tested with recent seizures    Data:  LABS:   Lab Results   Component Value Date     05/29/2021    K 4.3 05/29/2021     05/29/2021    CO2 23 05/29/2021    BUN 8 05/29/2021    CREATININE 0.7 05/29/2021    GFRAA >60 05/29/2021    LABGLOM >60 05/29/2021    GLUCOSE 90 05/29/2021    CALCIUM 8.8 05/29/2021     Lab Results   Component Value Date    WBC 7.7 05/29/2021    RBC 4.01 05/29/2021    HGB 12.7 05/29/2021    HCT 38.3 05/29/2021    MCV 95.5 05/29/2021    RDW 14.4 05/29/2021     05/29/2021   No results found for: INR, PROTIME    Neuroimaging were independently reviewed by me and discussed results with the patient  Reviewed notes from different physicians  Reviewed lab and blood testing    Impression:  Acute encephalopathy with recurrent seizures in a patient with known history of medically refractory epilepsy. Possible breakthrough seizures with subtherapeutic Keppra. Nonepileptic seizure is also a possibility giving recurrence of her seizures without clear triggers. UTI which could lower her seizure threshold    Recommendation:  Continue Keppra 1500 mg x 2  We will add Vimpat 50 mg x 2 for now  Discussed risk of suicidal ideation and thoughts with these medications. She voiced understanding  Discussed driving restriction. She is not allowed to drive until cleared from PCP and neurology  I also addressed risk of status epilepticus, sudden death and falling and injury with recurrent seizures. She voiced understanding. Continue hydration and antibiotics  DVT and GI prophylaxis  Neurochecks  Can be discharged from neurology when medically stable  Likely tomorrow  Follow-up with Dr. Adrian SALINAS for further management of her seizures. Would recommend then EMU evaluation at  if she continues to have breakthrough seizures  despite being on 2 different AED.       MDM: High due to risk of status epilepticus, injury and risk of sudden death and side effect of AED. Thank you for referring such patient. If you have any questions regarding my consult note, please don't hesitate to call me. Rashaun Lamb MD  [phone]    This dictation was generated by voice recognition computer software.  Although all attempts are made to edit the dictation for accuracy, there may be errors in the  transcription that are not intended

## 2021-05-29 NOTE — ED NOTES
Report given to 3T RN. Pt to be transferred on telemetry monitor.       Unknown NEW Blackman  05/29/21 2190

## 2021-05-29 NOTE — ED PROVIDER NOTES
[address] [facility]        Pt Name: Gladystine Schwab  MRN: [id number]  [place] 1990  Date of evaluation: 5/28/2021  Provider: BRITTNEE Damico  PCP: Julia Ryan  Note Started: 65:89 PM EDT       [facility]     CHIEF COMPLAINT       Chief Complaint   Patient presents with    Seizures      in via squad for seizures. states she has had multiple today. takes keppra. states she has been taking it. HISTORY OF PRESENT ILLNESS   (Location, Timing/Onset, Context/Setting, Quality, Duration, Modifying Factors, Severity, Associated Signs and Symptoms)  Note limiting factors. Gladystine Schwab is a 32 y.o. female with past medical history of seizures who presents to the ED with complaint of multiple seizures this afternoon. Patient that she is longstanding history of seizures since she was 6years old. Patient that she is currently on Keppra. States she has been taking as prescribed. Patient that she follows up with neurologist, Dr. Jeffrey Howard. States had first seizure at work today around 4:00. Had approximately 3 more seizures with the most recent just prior to arrival.  Seizure lasted as long as 30 seconds around 4:00 but most recent about 5 seconds. Patient states she feels fine at this time. Denies headache, neck pain, visual changes, speech disturbances, numbness/tingling or lightheadedness/dizziness. Denies fever chills. Denies rashes or lesions. Denies chest pain, shortness of breath, palpitations, Gigi pain, nausea/vomiting, urinary symptoms or changes in bowel movements. Denies bowel/bladder incontinence. Denies tongue biting or oral lesion. Became concerned and came to the ED by EMS for further evaluation and treatment. Patient denies any drug or alcohol usage. Nursing Notes were all reviewed and agreed with or any disagreements were addressed in the HPI.     REVIEW OF SYSTEMS    (2-9 systems for level 4, 10 or more for level 5)     Review of Systems   Constitutional: Negative for activity change, appetite change, chills, diaphoresis, fatigue and fever. Eyes: Negative for photophobia and visual disturbance. Respiratory: Negative. Negative for cough, chest tightness and shortness of breath. Cardiovascular: Negative. Negative for chest pain, palpitations and leg swelling. Gastrointestinal: Negative for abdominal pain, constipation, diarrhea, nausea and vomiting. Genitourinary: Negative for decreased urine volume, difficulty urinating, dysuria, flank pain, frequency, hematuria and urgency. Musculoskeletal: Negative for arthralgias, back pain, myalgias, neck pain and neck stiffness. Skin: Negative for color change, pallor, rash and wound. Neurological: Positive for seizures. Negative for dizziness, tremors, syncope, facial asymmetry, speech difficulty, weakness, light-headedness, numbness and headaches. Positives and Pertinent negatives as per HPI. Except as noted above in the ROS, all other systems were reviewed and negative. PAST MEDICAL HISTORY     Past Medical History:   Diagnosis Date    Seizures ([facility] [address]     Onset at 6years old         SURGICAL HISTORY   History reviewed. No pertinent surgical history. CURRENTMEDICATIONS       Previous Medications    LEVETIRACETAM (KEPPRA) 750 MG TABLET    Take 2 tablets by mouth twice daily         ALLERGIES     Patient has no known allergies. FAMILYHISTORY     History reviewed. No pertinent family history.        SOCIAL HISTORY       Social History     Tobacco Use    Smoking status: Current Every Day Smoker     Packs/day: 0.50     Years: 10.00     Pack years: 5.00    Smokeless tobacco: Never Used   Substance Use Topics    Alcohol use: Not Currently    Drug use: Never       SCREENINGS             PHYSICAL EXAM    (up to 7 for level 4, 8 or more for level 5)     ED Triage Vitals [05/28/21 2225]   BP Temp Temp Source Pulse Resp SpO2 Height Weight 112/70 98.2 °F (36.8 °C) Oral 84 16 96 % 5' 9\" (1.753 m) 120 lb (54.4 kg)       Physical Exam  Constitutional:       General: She is not in acute distress. Appearance: Normal appearance. She is well-developed. She is not ill-appearing, toxic-appearing or diaphoretic. HENT:      Head: Normocephalic and atraumatic. Comments: Atraumatic. No raccoon eyes or schwab sign. Right Ear: External ear normal.      Left Ear: External ear normal.      Nose: Nose normal. No congestion or rhinorrhea. Mouth/Throat:      Mouth: Mucous membranes are moist.      Pharynx: No oropharyngeal exudate or posterior oropharyngeal erythema. Comments: No oral lacerations or wounds noted. Eyes:      General:         Right eye: No discharge. Left eye: No discharge. Extraocular Movements: Extraocular movements intact. Conjunctiva/sclera: Conjunctivae normal.      Pupils: Pupils are equal, round, and reactive to light. Cardiovascular:      Rate and Rhythm: Normal rate and regular rhythm. Pulses: Normal pulses. Heart sounds: Normal heart sounds. No murmur heard. No friction rub. No gallop. Pulmonary:      Effort: Pulmonary effort is normal. No respiratory distress. Breath sounds: Normal breath sounds. No stridor. No wheezing, rhonchi or rales. Chest:      Chest wall: No tenderness. Abdominal:      General: Abdomen is flat. Bowel sounds are normal. There is no distension. Palpations: Abdomen is soft. There is no mass. Tenderness: There is no abdominal tenderness. There is no right CVA tenderness, left CVA tenderness, guarding or rebound. Hernia: No hernia is present. Musculoskeletal:         General: Normal range of motion. Cervical back: Normal range of motion and neck supple. Comments: No TTP to midline cervical, thoracic or lumbar spine. No anterior chest wall tenderness. No pelvis instability. No TTP of the upper and lower extremities throughout. Full range of motion strength of the upper and lower extremities throughout. Distal neurovascular intact. Gait deferred. Skin:     General: Skin is warm and dry. Coloration: Skin is not pale. Findings: No erythema or rash. Neurological:      General: No focal deficit present. Mental Status: She is alert and oriented to person, place, and time. GCS: GCS eye subscore is 4. GCS verbal subscore is 5. GCS motor subscore is 6. Cranial Nerves: Cranial nerves are intact. No cranial nerve deficit, dysarthria or facial asymmetry. Sensory: Sensation is intact. No sensory deficit. Motor: Motor function is intact. Coordination: Coordination is intact. Coordination normal. Finger-Nose-Finger Test and Heel to [facility] Test normal.      Comments: Gait deferred.    Psychiatric:         Behavior: Behavior normal.         DIAGNOSTIC RESULTS   LABS:    Labs Reviewed   COMPREHENSIVE METABOLIC PANEL W/ REFLEX TO MG FOR LOW K - Abnormal; Notable for the following components:       Result Value    Total Protein 6.3 (*)     All other components within normal limits    Narrative:     Performed at:  [facility] [address], [place], [address] [employer]   Phone [phone]   CBC WITH AUTO DIFFERENTIAL    Narrative:     Performed at:  [facility] [address], [place], [address]   Phone [phone]   HCG, SERUM, QUALITATIVE    Narrative:     Performed at:  [facility] [address], [place], [address]   Phone [phone]   LEVETIRACETAM LEVEL    Narrative:     Performed at:  [facility]  [address] [place], [place] [address]   Phone [phone]   URINE RT REFLEX TO CULTURE    Narrative:     Performed at:  [facility] [address], [place], [address]   Phone [phone]   TROPONIN    Narrative:     Performed at:  [facility] [address], [address]   Phone [phone]       All other labs were within normal range or not returned as of this dictation. EKG: All EKG's are interpreted by the Emergency Department Physician in the absence of a cardiologist.  Please see their note for interpretation of EKG. RADIOLOGY:   Non-plain film images such as CT, Ultrasound and MRI are read by the radiologist. Plain radiographic images are visualized and preliminarily interpreted by the ED Provider with the below findings:        Interpretation per the Radiologist below, if available at the time of this note:    XR CHEST PORTABLE   Final Result   Unremarkable single upright portable AP view of the chest.         CT HEAD WO CONTRAST   Final Result   No acute intracranial abnormality. No results found. PROCEDURES   Unless otherwise noted below, none     Procedures    CRITICAL CARE TIME   N/A    CONSULTS:  None      EMERGENCY DEPARTMENT COURSE and DIFFERENTIAL DIAGNOSIS/MDM:   Vitals:    Vitals:    05/28/21 2230 05/28/21 2300 05/28/21 2330 05/29/21 0000   BP: 115/78 117/76 114/68 117/68   Pulse: 82 81 90 81   Resp:       Temp:       TempSrc:       SpO2: 98% 99% 92% 97%   Weight:       Height:           Patient was given the following medications:  Medications   LORazepam (ATIVAN) injection 0.5 mg (0.5 mg Intravenous Given 5/28/21 2255)           Patient is a 66-year-old female who presents to the ED with complaint of a seizure. Longstanding history of seizures. Is already on Keppra. Has been taking as prescribed. Follow-up with neurology on outpatient basis. For seizures this evening. Long seizure lasting about 30 seconds. Patient arrived to the emergency department and was not postictal.  Reassuring neurologic semination here in the ED. Alert and oriented x3. CBC showed a white count, hemoglobin and platelets. CMP unremarkable. Pregnancy was negative. Urinalysis showed positive nitrite with 2+ bacteria and 6 white blood cells. Patient denies any urinary symptoms we will send for culture prior to treatment. Troponin normal.  Keppra pending. Chest x-ray unremarkable. CT of the head unremarkable. EKG interpreted by attending. Patient given Ativan here in the ED. Patient had no further seizure-like activity here in the emergency department. Continued reassuring neurologic examination believe and be safely discharged home. Will give small prescription for Ativan for home. Continue Keppra. Follow-up with neurology. Return the ED for new or worsening symptoms. Low suspicion for cardiac arrhythmia, electrolyte abnormality, sepsis, hypoglycemia, hyponatremia, CVA, TIA, subarachnoid hemorrhage, subdural hematoma, angitis, cellulitis or other emergent etiology at this time. Just prior to discharge was called to the patient's room and patient appeared to be having seizure-like activity per nursing staff. When I arrived patient appears postictal.  Did order further Ativan and loaded with Keppra. Given the fact patient had another seizure here in the emergency department had 5 seizures this afternoon and believe would benefit from admission for further evaluation by neurology. Case discussed with Dr. Brandie Mendoza, who admits for PCP, who graciously agreed to accept patient for admission at this time. FINAL IMPRESSION      1. Seizure [facility])          DISPOSITION/PLAN   DISPOSITION Decision To Discharge 05/29/2021 12:35:43 AM      PATIENT REFERRED TO:  Keon Colmenares MD  [address] [place] [address], [address]  [facility] [address] [address] [phone]    Schedule an appointment as soon as possible for a visit   For a Re-check in  2-3    days.     [facility] Emergency Department  [address] [facility]  [address] [facility] [id number] [phone]  Go to   As needed, If symptoms worsen      DISCHARGE MEDICATIONS:  New Prescriptions    LORAZEPAM (ATIVAN) 0.5 MG TABLET    Take 1 tablet by mouth

## 2021-05-29 NOTE — ED PROVIDER NOTES
1. EKG: Sinus rhythm, normal axis, normal intervals, no acute ST segment abnormalities     Presents to the ER for evaluation of seizure breakthrough, she has now had a total of 4 seizures breakthrough she was given IV Ativan Keppra loading, will be admitted for seizure breakthrough. Increase dose of her Keppra, no signs of sepsis or head trauma.       Impression: Seizure breakthrough     Alayna Melendez MD  91/59/16 0031       Alayna Melendez MD  92/01/54 0200       Alayna Melendez MD  06/19/86 0552

## 2021-05-29 NOTE — ED NOTES
Bed: 27  Expected date:   Expected time:   Means of arrival: [facility]  Comments:     Delroy Hylton RN  05/28/21 3822

## 2021-05-29 NOTE — H&P
History and Physical  Dr. Brandie Mendoza  5/29/2021    PCP: Jayshree Karimi    Cc:   Chief Complaint   Patient presents with    Seizures      in via squad for seizures. states she has had multiple today. takes keppra. states she has been taking it. HPI:  Clare Pandya is a 32 y.o. female who has a past medical history of Seizures ([address] Patient presents with Seizures ([address] HPI     32 y.o. female with past medical history of seizures who presents to the ED with complaint of multiple seizures this afternoon. Patient that she is longstanding history of seizures since she was 6years old. Patient that she is currently on Keppra. States she has been taking as prescribed. Patient that she follows up with neurologist, Dr. Wali Valdovinos. States had first seizure at work today around 4:00. Had approximately 3 more seizures with the most recent just prior to arrival.  Seizure lasted as long as 30 seconds around 4:00 but most recent about 5 seconds. Patient states she feels fine at this time. Denies headache, neck pain, visual changes, speech disturbances, numbness/tingling or lightheadedness/dizziness. Just prior to discharge from ER, the doc was called to the patient's room and patient appeared to be having seizure-like activity per nursing staff. The patient appears postictal.  IV Ativan ordered and loaded with Keppra. Given the fact patient had another seizure here in the emergency department had 5 seizures this afternoon and believe would benefit from admission for further evaluation by neurology. Review of old chart shows that [address] was raised to 1500 bid recently, however keppra level from ER is quite low. Also, found to have UTI  Started on iv rocephin    Problem list of hospitalization thus far:   Active Hospital Problems    Diagnosis     Seizures ([address] [R56.9]     UTI (urinary tract infection) [N39.0]     Tobacco abuse [Z72.0]     Bradycardia [R00.1]          Review of Systems: (1 system for EPF, 2-9 for detailed, 10+ for comprehensive)  Review of Systems   Constitutional: Negative for chills and fever. HENT: Negative for drooling and ear pain. Eyes: Negative for pain and redness. Respiratory: Negative for cough and shortness of breath. Cardiovascular: Negative for chest pain and leg swelling. Gastrointestinal: Negative for nausea and vomiting. Endocrine: Negative for polydipsia and polyphagia. Genitourinary: Negative for frequency and urgency. Musculoskeletal: Negative for gait problem and neck pain. Allergic/Immunologic: Negative for food allergies. Neurological: Positive for seizures. Negative for dizziness. Hematological: Negative for adenopathy. Psychiatric/Behavioral: Negative for agitation and decreased concentration. Past Medical History:   Past Medical History:   Diagnosis Date    Seizures ([address]     Onset at 6years old       Past Surgical History: History reviewed. No pertinent surgical history. Social History:   Social History     Tobacco History     Smoking Status  Current Every Day Smoker Smoking Frequency  0.5 packs/day for 10 years (5 pk yrs)    Smokeless Tobacco Use  Never Used          Alcohol History     Alcohol Use Status  Not Currently          Drug Use     Drug Use Status  Never          Sexual Activity     Sexually Active  Not Asked                Fam History: History reviewed. No pertinent family history. PFSH: The above PMHx, PSHx, SocHx, FamHx has been reviewed by myself. (1 area for detailed, 2-3 for comprehensive)      Code Status: Full Code    Meds - following list of home medications fromelectronic chart has been reviewed by myself  Prior to Admission medications    Medication Sig Start Date End Date Taking? Authorizing Provider   LORazepam (ATIVAN) 0.5 MG tablet Take 1 tablet by mouth three times daily for 3 days.  5/29/21 6/1/21 Yes BRITTNEE Cole   levETIRAcetam (KEPPRA) 750 MG tablet Take 2 tablets by mouth twice daily 5/26/21 Barrington Motta MD         No Known Allergies          EXAM: (2-7 system for EPF/Detailed, ?8 for Comprehensive)  BP (!) 92/54 Comment: notified cicely the nurse  Pulse 56   Temp 96.4 °F (35.8 °C) (Axillary)   Resp 15   Ht 5' 9\" (1.753 m)   Wt 119 lb 6.4 oz (54.2 kg)   SpO2 98%   BMI 17.63 kg/m²   Constitutional: vitals as above: alert, appears stated age and cooperative  Psychiatric: normal insight and judgment, oriented to person, place, time, and general circumstances  Head: Normocephalic, without obvious abnormality, atraumatic  Eyes:lids and lashes normal, conjunctivae and sclerae normal and pupils equal, round, reactive to light and accomodation  EMNT: external ears normal, lips normal  Neck: no adenopathy, supple, symmetrical, trachea midline and thyroid not enlarged, symmetric, no tenderness/mass/nodules   Respiratory: clear to auscultation and percussion bilaterally with normal respiratory effort  Cardiovascular: normal rate, regular rhythm, normal S1 and S2 and no carotid bruits  Gastrointestinal: soft, non-tender, non-distended, normal bowel sounds, no masses or organomegaly  Lymphatic:   Extremities: no edema, no clubbing  Skin:No rashes or nodules noted.   Neurologic:nonfocal at this time    LABS:  Labs Reviewed   COMPREHENSIVE METABOLIC PANEL W/ REFLEX TO MG FOR LOW K - Abnormal; Notable for the following components:       Result Value    Total Protein 6.3 (*)     All other components within normal limits    Narrative:     Performed at:  [facility] [address], [place], [address]   Phone [phone]   LEVETIRACETAM LEVEL - Abnormal; Notable for the following components:    Levetiracetam Lvl <2.0 (*)     All other components within normal limits    Narrative:     Performed at:  [place] [address]   Phone [phone]   URINE RT REFLEX TO CULTURE - Abnormal; Notable for the following components:    Clarity, UA CLOUDY (*)     Nitrite, Urine POSITIVE (*)     Leukocyte Esterase, Urine TRACE (*)     All other components within normal limits    Narrative:     Performed at:  [facility] [address], [facility], [address] [employer]   Phone [phone]   MICROSCOPIC URINALYSIS - Abnormal; Notable for the following components:    Bacteria, UA 2+ (*)     WBC, UA 6 (*)     All other components within normal limits    Narrative:     Performed at:  [facility] [address], [facility], [facility] [employer]   Phone [phone]   CBC WITH AUTO DIFFERENTIAL    Narrative:     Performed at:  [facility] [address], [facility], [facility] [employer]   Phone [phone]   HCG, SERUM, QUALITATIVE    Narrative:     Performed at:  [facility] [address], [facility], [facility] [employer]   Phone [phone]   TROPONIN    Narrative:     Performed at:  [facility] [address], [facility], [facility] [employer]   Phone [phone]         IMAGING:  Imaging results from the ER have been reviewed in the computerized chart. CT HEAD WO CONTRAST    Result Date: 5/28/2021  EXAMINATION: CT OF THE HEAD WITHOUT CONTRAST  5/28/2021 10:34 pm TECHNIQUE: CT of the head was performed without the administration of intravenous contrast. Dose modulation, iterative reconstruction, and/or weight based adjustment of the mA/kV was utilized to reduce the radiation dose to as low as reasonably achievable. COMPARISON: CT head without contrast December 14, 2020. HISTORY: ORDERING SYSTEM PROVIDED HISTORY: seizure TECHNOLOGIST PROVIDED HISTORY: Has a \"code stroke\" or \"stroke alert\" been called? ->No Reason for exam:->seizure Decision Support Exception - unselect if not a suspected or confirmed emergency medical condition->Emergency Medical Condition (MA) Is the patient pregnant?->No Reason for Exam: Seizures ( in via squad for seizures. states she has had multiple today. takes Keppra. States she has been taking it. ) FINDINGS: BRAIN/VENTRICLES: There is no acute intracranial hemorrhage, mass effect or midline shift. No abnormal extra-axial fluid collection. The gray-white differentiation is maintained without evidence of an acute infarct. There is no evidence of hydrocephalus. ORBITS: The visualized portion of the orbits demonstrate no acute abnormality. SINUSES: The visualized paranasal sinuses and mastoid air cells demonstrate no acute abnormality. SOFT TISSUES/SKULL:  No acute abnormality of the visualized skull or soft tissues. No acute intracranial abnormality. XR CHEST PORTABLE    Result Date: 5/28/2021  EXAMINATION: ONE XRAY VIEW OF THE CHEST 5/28/2021 10:36 pm COMPARISON: None. HISTORY: ORDERING SYSTEM PROVIDED HISTORY: seizure TECHNOLOGIST PROVIDED HISTORY: Reason for exam:->seizure FINDINGS: The heart is normal in size and configuration. The mediastinal contours are within normal limits. The lungs are well aerated. The pleural surfaces are normal and no evidence of a pleural effusion is seen. Bones and soft tissues are unremarkable. Unremarkable single upright portable AP view of the chest.         EKG: from ER, normal sinus rhythm at 77. No acute st elevation notd. No TWI seen. Comparison made to ekg in old chart dated 11/3/20, appears similar inrate and form          MEDICAL DECISION MAKING:    Principal Problem:    Seizures ([address] -Established problem. Uncontrolled. Recurrent seizures. Very low keppra level  Plan: admit, check eeg. Trend CK. Check MRI. Neuro consulted. IV keppra and ativan ordered  Active Problems:    UTI (urinary tract infection) -Established problem. Stable. Plan: iiv rocephin ordered. Await cx. Tobacco abuse -Established problem. Stable. Plan: advised to quit smoking    Bradycardia -New Problem to me.   HR in 50s on tele this am  Plan:

## 2021-05-30 LAB
ESTIMATED AVERAGE GLUCOSE: 111.2 MG/DL
HBA1C MFR BLD: 5.5 %

## 2021-05-30 NOTE — DISCHARGE SUMMARY
[facility] -- Physician Discharge Summary     Haile Siddiqui  1990  MRN: [id number]    Admit Date: 5/28/2021  Discharge Date: 5/29/2021  2:26 PM    Attending MD: No att. providers found  Discharging MD: Anjali Montero MD  PCP: Nilsa Bateman [address] [facility] / [address] [facility] [facility] [phone]    Admission Diagnosis: Seizures ([facility] [address] [R56.9]  DISCHARGE DIAGNOSIS: same    Full Hospital Problem List:  Active Hospital Problems    Diagnosis Date Noted    Seizure [facility]) [R56.9] 05/29/2021    UTI (urinary tract infection) [N39.0] 05/29/2021    Tobacco abuse [Z72.0] 05/29/2021    Bradycardia [R00.1] 05/29/2021    Partial idiopathic epilepsy with seizures of localized onset, intractable, with status epilepticus ([facility] [address] Course:  Pt admitted for seizures  She left AMA  Did not get instructions nor prescriptions    Consults made during Hospitalization:  IP CONSULT TO INTERNAL MEDICINE  IP CONSULT TO NEUROLOGY    Treatment team at time of Discharge: Treatment Team: Consulting Physician: Elizabeth Rutledge MD; Respiratory Therapist (Day): Simona Omer RCP; Registered Nurse: Speedy Carrasco RN    Imaging Results:  CT HEAD WO CONTRAST    Result Date: 5/28/2021  EXAMINATION: CT OF THE HEAD WITHOUT CONTRAST  5/28/2021 10:34 pm TECHNIQUE: CT of the head was performed without the administration of intravenous contrast. Dose modulation, iterative reconstruction, and/or weight based adjustment of the mA/kV was utilized to reduce the radiation dose to as low as reasonably achievable. COMPARISON: CT head without contrast December 14, 2020. HISTORY: ORDERING SYSTEM PROVIDED HISTORY: seizure TECHNOLOGIST PROVIDED HISTORY: Has a \"code stroke\" or \"stroke alert\" been called? ->No Reason for exam:->seizure Decision Support Exception - unselect if not a suspected or confirmed emergency medical condition->Emergency Medical Condition (MA) Is the patient pregnant?->No Reason for Exam: Seizures ( in via squad for seizures. states she has had multiple today. takes Keppra. States she has been taking it. ) FINDINGS: BRAIN/VENTRICLES: There is no acute intracranial hemorrhage, mass effect or midline shift. No abnormal extra-axial fluid collection. The gray-white differentiation is maintained without evidence of an acute infarct. There is no evidence of hydrocephalus. ORBITS: The visualized portion of the orbits demonstrate no acute abnormality. SINUSES: The visualized paranasal sinuses and mastoid air cells demonstrate no acute abnormality. SOFT TISSUES/SKULL:  No acute abnormality of the visualized skull or soft tissues. No acute intracranial abnormality. XR CHEST PORTABLE    Result Date: 5/28/2021  EXAMINATION: ONE XRAY VIEW OF THE CHEST 5/28/2021 10:36 pm COMPARISON: None. HISTORY: ORDERING SYSTEM PROVIDED HISTORY: seizure TECHNOLOGIST PROVIDED HISTORY: Reason for exam:->seizure FINDINGS: The heart is normal in size and configuration. The mediastinal contours are within normal limits. The lungs are well aerated. The pleural surfaces are normal and no evidence of a pleural effusion is seen. Bones and soft tissues are unremarkable. Unremarkable single upright portable AP view of the chest.     MRI brain without contrast    Result Date: 5/29/2021  EXAMINATION: MRI OF THE BRAIN WITHOUT CONTRAST  5/29/2021 11:23 am TECHNIQUE: Multiplanar multisequence MRI of the brain was performed without the administration of intravenous contrast. COMPARISON: CT head 05/20/2021 HISTORY: ORDERING SYSTEM PROVIDED HISTORY: TIA TECHNOLOGIST PROVIDED HISTORY: Reason for exam:->TIA Is the patient pregnant?->No Reason for Exam: Previous mri [place] 12/2020. History of seizures since age 6. Approx 5 seizures yesterday. Motion due to confusion, reminded multiple times to hold still. Acuity: Chronic Type of Exam: Ongoing FINDINGS: INTRACRANIAL STRUCTURES/VENTRICLES: There is no acute infarct.  No mass effect or midline shift. No evidence of an acute intracranial hemorrhage. The ventricles and sulci are normal in size and configuration. The sellar/suprasellar regions appear unremarkable. The normal signal voids within the major intracranial vessels appear maintained. ORBITS: The visualized portion of the orbits demonstrate no acute abnormality. SINUSES: The visualized paranasal sinuses and mastoid air cells are well aerated. BONES/SOFT TISSUES: The bone marrow signal intensity appears normal. The soft tissues demonstrate no acute abnormality. No acute ischemia         Discharge Exam:  AMA    Disposition: AMA    Condition: AMA    Discharge Medications:   Urban Job   Home Medication Instructions BLI:[id number]    Printed on:05/30/21 0906   Medication Information                      levETIRAcetam (KEPPRA) 750 MG tablet  Take 2 tablets by mouth twice daily             LORazepam (ATIVAN) 0.5 MG tablet  Take 1 tablet by mouth three times daily for 3 days. Allergies:  No Known Allergies    Follow up Instructions: Follow-up with PCP: Delroy Sadler  .       Total time spent on day of discharge including face-to-face visit, examination, documentation, counseling, preparation of discharge plans and followup, and discharge medicine reconciliation and presciptions is 31 minutes    Signed:  Manford Schilder, MD  5/30/2021

## 2021-06-07 ENCOUNTER — TELEPHONE (OUTPATIENT)
Dept: FAMILY MEDICINE CLINIC | Age: 31
End: 2021-06-07

## 2021-06-07 NOTE — TELEPHONE ENCOUNTER
Patient already had an MRI of her brain done on 5/29/2021 when she was hospitalized at [facility]. The MRI of her brain was overall unremarkable. I have only seen this patient 1 time, which was a telephone visit by the way on 3/1/2021. I did not order imaging of any kind after that visit with her. She has followed up with neurologist (Dr. Janes Castro). She should direct any concerns of seizure to him. Hudson Stevenson [address]

## 2021-06-07 NOTE — TELEPHONE ENCOUNTER
Patient calling checking on status of MRI of Brain. Her neurologist told her to contact our office. Patient had gone to hospital. Also, the  told her she had outstanding orders. Does provider need March orders labs done still?

## 2021-06-28 PROBLEM — N39.0 UTI (URINARY TRACT INFECTION): Status: RESOLVED | Noted: 2021-05-29 | Resolved: 2021-06-28

## 2021-07-30 ENCOUNTER — TELEPHONE (OUTPATIENT)
Dept: NEUROLOGY | Age: 31
End: 2021-07-30

## 2021-07-30 NOTE — TELEPHONE ENCOUNTER
The PT's  is calling to see if the PT is staying medication compliant and still coming in office for treatment.   Call and advise

## 2021-07-30 NOTE — TELEPHONE ENCOUNTER
Reached [facility] stating we needed a signed release from the patient in order to discuss this information.

## 2021-09-17 ENCOUNTER — HOSPITAL ENCOUNTER (INPATIENT)
Age: 31
LOS: 1 days | Discharge: LEFT BEFORE DISCHARGE | DRG: 053 | End: 2021-09-18
Attending: EMERGENCY MEDICINE | Admitting: INTERNAL MEDICINE
Payer: COMMERCIAL

## 2021-09-17 ENCOUNTER — APPOINTMENT (OUTPATIENT)
Dept: GENERAL RADIOLOGY | Age: 31
DRG: 053 | End: 2021-09-17
Payer: COMMERCIAL

## 2021-09-17 DIAGNOSIS — G40.901 STATUS EPILEPTICUS (HCC): Primary | ICD-10-CM

## 2021-09-17 LAB
A/G RATIO: 1.9 (ref 1.1–2.2)
ALBUMIN SERPL-MCNC: 3.3 G/DL (ref 3.4–5)
ALP BLD-CCNC: 70 U/L (ref 40–129)
ALT SERPL-CCNC: 12 U/L (ref 10–40)
ANION GAP SERPL CALCULATED.3IONS-SCNC: 6 MMOL/L (ref 3–16)
AST SERPL-CCNC: 19 U/L (ref 15–37)
BASOPHILS ABSOLUTE: 0 K/UL (ref 0–0.2)
BASOPHILS RELATIVE PERCENT: 0.6 %
BILIRUB SERPL-MCNC: <0.2 MG/DL (ref 0–1)
BUN BLDV-MCNC: 4 MG/DL (ref 7–20)
CALCIUM SERPL-MCNC: 8 MG/DL (ref 8.3–10.6)
CHLORIDE BLD-SCNC: 106 MMOL/L (ref 99–110)
CO2: 26 MMOL/L (ref 21–32)
CREAT SERPL-MCNC: 0.6 MG/DL (ref 0.6–1.1)
EOSINOPHILS ABSOLUTE: 0.2 K/UL (ref 0–0.6)
EOSINOPHILS RELATIVE PERCENT: 3.2 %
GFR AFRICAN AMERICAN: >60
GFR NON-AFRICAN AMERICAN: >60
GLOBULIN: 1.7 G/DL
GLUCOSE BLD-MCNC: 88 MG/DL (ref 70–99)
HCG QUALITATIVE: NEGATIVE
HCT VFR BLD CALC: 36.2 % (ref 36–48)
HEMOGLOBIN: 12.5 G/DL (ref 12–16)
LYMPHOCYTES ABSOLUTE: 1.9 K/UL (ref 1–5.1)
LYMPHOCYTES RELATIVE PERCENT: 24.5 %
MCH RBC QN AUTO: 33 PG (ref 26–34)
MCHC RBC AUTO-ENTMCNC: 34.6 G/DL (ref 31–36)
MCV RBC AUTO: 95.4 FL (ref 80–100)
MONOCYTES ABSOLUTE: 0.4 K/UL (ref 0–1.3)
MONOCYTES RELATIVE PERCENT: 4.8 %
NEUTROPHILS ABSOLUTE: 5.1 K/UL (ref 1.7–7.7)
NEUTROPHILS RELATIVE PERCENT: 66.9 %
PDW BLD-RTO: 13.3 % (ref 12.4–15.4)
PLATELET # BLD: 167 K/UL (ref 135–450)
PMV BLD AUTO: 9.1 FL (ref 5–10.5)
POTASSIUM REFLEX MAGNESIUM: 3.8 MMOL/L (ref 3.5–5.1)
RBC # BLD: 3.79 M/UL (ref 4–5.2)
SODIUM BLD-SCNC: 138 MMOL/L (ref 136–145)
TOTAL CK: 85 U/L (ref 26–192)
TOTAL PROTEIN: 5 G/DL (ref 6.4–8.2)
TROPONIN: <0.01 NG/ML
WBC # BLD: 7.6 K/UL (ref 4–11)

## 2021-09-17 PROCEDURE — 6370000000 HC RX 637 (ALT 250 FOR IP): Performed by: EMERGENCY MEDICINE

## 2021-09-17 PROCEDURE — 85025 COMPLETE CBC W/AUTO DIFF WBC: CPT

## 2021-09-17 PROCEDURE — 6360000002 HC RX W HCPCS: Performed by: EMERGENCY MEDICINE

## 2021-09-17 PROCEDURE — 84703 CHORIONIC GONADOTROPIN ASSAY: CPT

## 2021-09-17 PROCEDURE — 2580000003 HC RX 258: Performed by: EMERGENCY MEDICINE

## 2021-09-17 PROCEDURE — 93005 ELECTROCARDIOGRAM TRACING: CPT | Performed by: EMERGENCY MEDICINE

## 2021-09-17 PROCEDURE — 36415 COLL VENOUS BLD VENIPUNCTURE: CPT

## 2021-09-17 PROCEDURE — 80177 DRUG SCRN QUAN LEVETIRACETAM: CPT

## 2021-09-17 PROCEDURE — P9612 CATHETERIZE FOR URINE SPEC: HCPCS

## 2021-09-17 PROCEDURE — 96375 TX/PRO/DX INJ NEW DRUG ADDON: CPT

## 2021-09-17 PROCEDURE — 99284 EMERGENCY DEPT VISIT MOD MDM: CPT

## 2021-09-17 PROCEDURE — 82550 ASSAY OF CK (CPK): CPT

## 2021-09-17 PROCEDURE — 96365 THER/PROPH/DIAG IV INF INIT: CPT

## 2021-09-17 PROCEDURE — 84484 ASSAY OF TROPONIN QUANT: CPT

## 2021-09-17 PROCEDURE — 71045 X-RAY EXAM CHEST 1 VIEW: CPT

## 2021-09-17 PROCEDURE — 96374 THER/PROPH/DIAG INJ IV PUSH: CPT

## 2021-09-17 PROCEDURE — 80053 COMPREHEN METABOLIC PANEL: CPT

## 2021-09-17 RX ORDER — ONDANSETRON 2 MG/ML
4 INJECTION INTRAMUSCULAR; INTRAVENOUS ONCE
Status: COMPLETED | OUTPATIENT
Start: 2021-09-17 | End: 2021-09-17

## 2021-09-17 RX ORDER — ACETAMINOPHEN 325 MG/1
650 TABLET ORAL ONCE
Status: COMPLETED | OUTPATIENT
Start: 2021-09-17 | End: 2021-09-17

## 2021-09-17 RX ORDER — 0.9 % SODIUM CHLORIDE 0.9 %
1000 INTRAVENOUS SOLUTION INTRAVENOUS ONCE
Status: COMPLETED | OUTPATIENT
Start: 2021-09-17 | End: 2021-09-17

## 2021-09-17 RX ADMIN — ACETAMINOPHEN 650 MG: 325 TABLET ORAL at 22:48

## 2021-09-17 RX ADMIN — LEVETIRACETAM 1500 MG: 100 INJECTION, SOLUTION INTRAVENOUS at 23:01

## 2021-09-17 RX ADMIN — ONDANSETRON 4 MG: 2 INJECTION INTRAMUSCULAR; INTRAVENOUS at 22:48

## 2021-09-17 RX ADMIN — SODIUM CHLORIDE 1000 ML: 9 INJECTION, SOLUTION INTRAVENOUS at 22:47

## 2021-09-17 ASSESSMENT — PAIN SCALES - GENERAL: PAINLEVEL_OUTOF10: 6

## 2021-09-18 ENCOUNTER — APPOINTMENT (OUTPATIENT)
Dept: MRI IMAGING | Age: 31
DRG: 053 | End: 2021-09-18
Payer: COMMERCIAL

## 2021-09-18 VITALS
OXYGEN SATURATION: 96 % | WEIGHT: 122.1 LBS | SYSTOLIC BLOOD PRESSURE: 103 MMHG | TEMPERATURE: 98.1 F | DIASTOLIC BLOOD PRESSURE: 68 MMHG | HEIGHT: 67 IN | HEART RATE: 63 BPM | BODY MASS INDEX: 19.16 KG/M2 | RESPIRATION RATE: 16 BRPM

## 2021-09-18 PROBLEM — R63.6 UNDERWEIGHT: Status: ACTIVE | Noted: 2021-09-18

## 2021-09-18 PROBLEM — R56.9 SEIZURES (HCC): Status: ACTIVE | Noted: 2021-09-18

## 2021-09-18 PROBLEM — Z91.199 NONCOMPLIANCE: Status: ACTIVE | Noted: 2021-09-18

## 2021-09-18 LAB
AMPHETAMINE SCREEN, URINE: NORMAL
BARBITURATE SCREEN URINE: NORMAL
BENZODIAZEPINE SCREEN, URINE: NORMAL
BILIRUBIN URINE: NEGATIVE
BLOOD, URINE: NEGATIVE
CANNABINOID SCREEN URINE: NORMAL
CLARITY: CLEAR
COCAINE METABOLITE SCREEN URINE: NORMAL
COLOR: YELLOW
EKG ATRIAL RATE: 83 BPM
EKG DIAGNOSIS: NORMAL
EKG P AXIS: 79 DEGREES
EKG P-R INTERVAL: 152 MS
EKG Q-T INTERVAL: 370 MS
EKG QRS DURATION: 70 MS
EKG QTC CALCULATION (BAZETT): 434 MS
EKG R AXIS: 92 DEGREES
EKG T AXIS: 74 DEGREES
EKG VENTRICULAR RATE: 83 BPM
GLUCOSE URINE: NEGATIVE MG/DL
KEPPRA DOSE AMT: ABNORMAL
KEPPRA: 5.2 UG/ML (ref 6–46)
KETONES, URINE: NEGATIVE MG/DL
LEUKOCYTE ESTERASE, URINE: NEGATIVE
Lab: NORMAL
METHADONE SCREEN, URINE: NORMAL
MICROSCOPIC EXAMINATION: NORMAL
NITRITE, URINE: NEGATIVE
OPIATE SCREEN URINE: NORMAL
OXYCODONE URINE: NORMAL
PH UA: 7
PH UA: 7 (ref 5–8)
PHENCYCLIDINE SCREEN URINE: NORMAL
PROPOXYPHENE SCREEN: NORMAL
PROTEIN UA: NEGATIVE MG/DL
SPECIFIC GRAVITY UA: 1.01 (ref 1–1.03)
TOTAL CK: 60 U/L (ref 26–192)
URINE REFLEX TO CULTURE: NORMAL
URINE TYPE: NORMAL
UROBILINOGEN, URINE: 0.2 E.U./DL

## 2021-09-18 PROCEDURE — G0378 HOSPITAL OBSERVATION PER HR: HCPCS

## 2021-09-18 PROCEDURE — 80307 DRUG TEST PRSMV CHEM ANLYZR: CPT

## 2021-09-18 PROCEDURE — 70551 MRI BRAIN STEM W/O DYE: CPT

## 2021-09-18 PROCEDURE — 96372 THER/PROPH/DIAG INJ SC/IM: CPT

## 2021-09-18 PROCEDURE — 2060000000 HC ICU INTERMEDIATE R&B

## 2021-09-18 PROCEDURE — 80177 DRUG SCRN QUAN LEVETIRACETAM: CPT

## 2021-09-18 PROCEDURE — 99254 IP/OBS CNSLTJ NEW/EST MOD 60: CPT | Performed by: PSYCHIATRY & NEUROLOGY

## 2021-09-18 PROCEDURE — 6370000000 HC RX 637 (ALT 250 FOR IP): Performed by: INTERNAL MEDICINE

## 2021-09-18 PROCEDURE — 6360000002 HC RX W HCPCS: Performed by: INTERNAL MEDICINE

## 2021-09-18 PROCEDURE — 36415 COLL VENOUS BLD VENIPUNCTURE: CPT

## 2021-09-18 PROCEDURE — 2580000003 HC RX 258: Performed by: INTERNAL MEDICINE

## 2021-09-18 PROCEDURE — 81003 URINALYSIS AUTO W/O SCOPE: CPT

## 2021-09-18 PROCEDURE — 93010 ELECTROCARDIOGRAM REPORT: CPT | Performed by: INTERNAL MEDICINE

## 2021-09-18 PROCEDURE — 82550 ASSAY OF CK (CPK): CPT

## 2021-09-18 RX ORDER — LABETALOL HYDROCHLORIDE 5 MG/ML
10 INJECTION, SOLUTION INTRAVENOUS EVERY 10 MIN PRN
Status: DISCONTINUED | OUTPATIENT
Start: 2021-09-18 | End: 2021-09-18 | Stop reason: HOSPADM

## 2021-09-18 RX ORDER — 0.9 % SODIUM CHLORIDE 0.9 %
500 INTRAVENOUS SOLUTION INTRAVENOUS PRN
Status: DISCONTINUED | OUTPATIENT
Start: 2021-09-18 | End: 2021-09-18 | Stop reason: HOSPADM

## 2021-09-18 RX ORDER — ASPIRIN 300 MG/1
300 SUPPOSITORY RECTAL DAILY
Status: DISCONTINUED | OUTPATIENT
Start: 2021-09-18 | End: 2021-09-18 | Stop reason: HOSPADM

## 2021-09-18 RX ORDER — ONDANSETRON 4 MG/1
4 TABLET, ORALLY DISINTEGRATING ORAL EVERY 8 HOURS PRN
Status: DISCONTINUED | OUTPATIENT
Start: 2021-09-18 | End: 2021-09-18 | Stop reason: HOSPADM

## 2021-09-18 RX ORDER — POTASSIUM CHLORIDE 7.45 MG/ML
10 INJECTION INTRAVENOUS PRN
Status: DISCONTINUED | OUTPATIENT
Start: 2021-09-18 | End: 2021-09-18 | Stop reason: HOSPADM

## 2021-09-18 RX ORDER — LEVETIRACETAM 500 MG/1
1500 TABLET ORAL 2 TIMES DAILY
Status: DISCONTINUED | OUTPATIENT
Start: 2021-09-18 | End: 2021-09-18 | Stop reason: HOSPADM

## 2021-09-18 RX ORDER — SODIUM CHLORIDE 0.9 % (FLUSH) 0.9 %
10 SYRINGE (ML) INJECTION PRN
Status: DISCONTINUED | OUTPATIENT
Start: 2021-09-18 | End: 2021-09-18 | Stop reason: HOSPADM

## 2021-09-18 RX ORDER — ONDANSETRON 2 MG/ML
4 INJECTION INTRAMUSCULAR; INTRAVENOUS EVERY 6 HOURS PRN
Status: DISCONTINUED | OUTPATIENT
Start: 2021-09-18 | End: 2021-09-18 | Stop reason: HOSPADM

## 2021-09-18 RX ORDER — HYDRALAZINE HYDROCHLORIDE 20 MG/ML
10 INJECTION INTRAMUSCULAR; INTRAVENOUS EVERY 6 HOURS PRN
Status: DISCONTINUED | OUTPATIENT
Start: 2021-09-18 | End: 2021-09-18 | Stop reason: HOSPADM

## 2021-09-18 RX ORDER — SODIUM CHLORIDE 9 MG/ML
25 INJECTION, SOLUTION INTRAVENOUS PRN
Status: DISCONTINUED | OUTPATIENT
Start: 2021-09-18 | End: 2021-09-18 | Stop reason: HOSPADM

## 2021-09-18 RX ORDER — SODIUM CHLORIDE 0.9 % (FLUSH) 0.9 %
10 SYRINGE (ML) INJECTION EVERY 12 HOURS SCHEDULED
Status: DISCONTINUED | OUTPATIENT
Start: 2021-09-18 | End: 2021-09-18 | Stop reason: HOSPADM

## 2021-09-18 RX ORDER — ASPIRIN 81 MG/1
81 TABLET ORAL DAILY
Status: DISCONTINUED | OUTPATIENT
Start: 2021-09-18 | End: 2021-09-18 | Stop reason: HOSPADM

## 2021-09-18 RX ORDER — POTASSIUM CHLORIDE 20 MEQ/1
40 TABLET, EXTENDED RELEASE ORAL PRN
Status: DISCONTINUED | OUTPATIENT
Start: 2021-09-18 | End: 2021-09-18 | Stop reason: HOSPADM

## 2021-09-18 RX ADMIN — ENOXAPARIN SODIUM 40 MG: 40 INJECTION SUBCUTANEOUS at 09:56

## 2021-09-18 RX ADMIN — ASPIRIN 81 MG: 81 TABLET, COATED ORAL at 09:56

## 2021-09-18 RX ADMIN — LEVETIRACETAM 1500 MG: 500 TABLET ORAL at 09:56

## 2021-09-18 RX ADMIN — Medication 10 ML: at 09:55

## 2021-09-18 ASSESSMENT — ENCOUNTER SYMPTOMS
VOMITING: 0
NAUSEA: 0
COUGH: 0
EYE REDNESS: 0
EYE PAIN: 0
SHORTNESS OF BREATH: 0

## 2021-09-18 ASSESSMENT — PAIN SCALES - GENERAL
PAINLEVEL_OUTOF10: 0

## 2021-09-18 NOTE — PROGRESS NOTES
Vitals:    09/18/21 0945   BP: 103/66   Pulse: 52   Resp: 16   Temp: 98.1 °F (36.7 °C)   SpO2: 94%     A/O x4, PASSED swallow screen prior to admin of AM PO medications, will notify hospitalist and req. Order for diet via perfect serve. Denies any auras, s/s of seizure activity, none noted at this time. Call light within reach.

## 2021-09-18 NOTE — H&P
History and Physical  Memo Dodge Person  9/18/2021    PCP: Sabrina Bartholomew    Cc:   Chief Complaint   Patient presents with    Seizures     3 witnessed seizures by patients father today, patient states she takes her seizure meds daily       HPI:  Ward Rees is a 32 y.o. female who has a past medical history of Seizures ([facility] [address] Patient presents with Seizures ([facility] [address] HPI    Pt is 25yo F known from recent admissoin for similar sx  She left ama at that time  Apparently non-compliant with meds - which is what provoked last admission, and likely provoked this one    Pt cannot give any details  Per ER RN  Patient in by [facility] for reports of 3 witnessed seizures by her father today, none witnessed by EMS. Patient reports that she takes her seizure meds daily as prescribed. Reports smoking, denies drinking alcohol and street drugs at this time. When asked about her seizures today she is unaware, keeps stating \"my dad said I had 3 seizures today. \"   Patient states she \"was asleep\" when the seizures occurred. Patient alert to self and knows she is in the hospital.    To be admitted  Resumed on meds  Neurology asked to see  MRI and EEG ordered      Problem list of hospitalization thus far: Active Hospital Problems    Diagnosis     Seizures ([facility] [address] [R56.9]     Underweight [R63.6]     Noncompliance [Z91.19]     Tobacco abuse [Z72.0]          Review of Systems: (1 system for EPF, 2-9 for detailed, 10+ for comprehensive)  Review of Systems   Constitutional: Negative for chills and fever. HENT: Negative for drooling and ear discharge. Eyes: Negative for pain and redness. Respiratory: Negative for cough and shortness of breath. Cardiovascular: Negative for chest pain and leg swelling. Gastrointestinal: Negative for nausea and vomiting. Endocrine: Negative for polydipsia and polyphagia. Genitourinary: Negative for frequency and urgency. Musculoskeletal: Negative for joint swelling and neck pain. Allergic/Immunologic: Negative for food allergies. Neurological: Positive for seizures. Negative for dizziness. Hematological: Negative for adenopathy. Psychiatric/Behavioral: Negative for agitation and confusion. Past Medical History:   Past Medical History:   Diagnosis Date    Seizures ([address]     Onset at 6years old       Past Surgical History: History reviewed. No pertinent surgical history. Social History:   Social History     Tobacco History     Smoking Status  Current Every Day Smoker Smoking Frequency  0.5 packs/day for 10 years (5 pk yrs)    Smokeless Tobacco Use  Never Used          Alcohol History     Alcohol Use Status  Not Currently          Drug Use     Drug Use Status  Never          Sexual Activity     Sexually Active  Not Asked                Fam History: History reviewed. No pertinent family history. PFSH: The above PMHx, PSHx, SocHx, FamHx has been reviewed by myself. (1 area for detailed, 2-3 for comprehensive)      Code Status: Full Code    Meds - following list of home medications fromelectronic chart has been reviewed by myself  Prior to Admission medications    Medication Sig Start Date End Date Taking?  Authorizing Provider   levETIRAcetam (KEPPRA) 750 MG tablet Take 2 tablets by mouth twice daily 5/26/21   Matthew Cruz MD         No Known Allergies          EXAM: (2-7 system for EPF/Detailed, ?8 for Comprehensive)  /64   Pulse 72   Temp 97.8 °F (36.6 °C) (Oral)   Resp 18   Ht 5' 7\" (1.702 m)   Wt 122 lb 1.6 oz (55.4 kg)   SpO2 94%   BMI 19.12 kg/m²   Constitutional: vitals as above: alert, appears stated age and cooperative  Head: Normocephalic, without obvious abnormality, atraumatic  Eyes:lids and lashes normal, conjunctivae and sclerae normal and pupils equal, round, reactive to light and accomodation  EMNT: external ears normal, lips normal  Neck: no adenopathy, supple, symmetrical, trachea midline and thyroid not enlarged, symmetric, no tenderness/mass/nodules   Respiratory: clear to auscultation and percussion bilaterally with normal respiratory effort  Cardiovascular: normal rate, regular rhythm, normal S1 and S2 and no carotid bruits  Gastrointestinal: soft, non-tender, non-distended, normal bowel sounds, no masses or organomegaly  Lymphatic:   Extremities: no edema, no clubbing  Skin:No rashes or nodules noted.   Neurologic:nionfocal    LABS:  Labs Reviewed   CBC WITH AUTO DIFFERENTIAL - Abnormal; Notable for the following components:       Result Value    RBC 3.79 (*)     All other components within normal limits    Narrative:     Performed at:  [facility] [address], [employer]   Phone [phone]   COMPREHENSIVE METABOLIC PANEL W/ REFLEX TO MG FOR LOW K - Abnormal; Notable for the following components:    BUN 4 (*)     Calcium 8.0 (*)     Total Protein 5.0 (*)     Albumin 3.3 (*)     All other components within normal limits    Narrative:     Performed at:  [facility] [address], [employer]   Phone [phone]   HCG, SERUM, QUALITATIVE    Narrative:     Performed at:  [facility] [address] [facility] [employer] [employer]   Phone [phone]   URINE RT REFLEX TO CULTURE    Narrative:     Performed at:  [facility] [address], [employer]   Phone [phone]   URINE DRUG SCREEN    Narrative:     Performed at:  [facility] [address], [employer]   Phone [phone]   LEVETIRACETAM LEVEL    Narrative:     Performed at:  [facility]  [address] [facility] [address]   Phone [phone]   TROPONIN    Narrative:     Performed at:  [facility] [address], [employer]   Phone [phone]   CK    Narrative:     Performed at:  [facility] [facility] [address]   Phone [phone]         IMAGING:  Imaging results from the ER have been reviewed in the computerized chart. XR CHEST PORTABLE    Result Date: 9/17/2021  EXAMINATION: ONE XRAY VIEW OF THE CHEST 9/17/2021 10:34 pm COMPARISON: 05/28/2021 HISTORY: ORDERING SYSTEM PROVIDED HISTORY: chest pain TECHNOLOGIST PROVIDED HISTORY: Reason for exam:->chest pain FINDINGS: The heart is normal.  The pulmonary vessels are normal.  The lungs are mildly hyperinflated. No consolidation or effusion is seen. The bones are intact. Stable chest with no acute abnormality seen         EKG: from ER,interpreted by self, sinus rhythm at 83. No acute st elevation noted. No TWI seen. Comparison made to ekg in old chart dated 12/14/20, appears similar but rate slightly higher at [facility] MAKING:    Principal Problem:    Seizures ([address] - Established problem. Uncontrolled. Pt with reported sz. None witnessed here. Theoretically on keppra. Plan: check keppra level. Admit. Resume meds. Mri, eeg, neuro consult ordered  Active Problems: -    Tobacco abuse -Established problem. Stable. Plan: advised to quit    Underweight -Established problem. Stable. bmi low  Plan: Continue present orders/plan. Noncompliance -Established problem. Stable. Plan: Continue present orders/plan. Diagnoses as listed above, designated as new or established and plan outlined for each. Data Reviewed:   (1) Lab tests were reviewed or ordered. (1) Radiology tests were reviewed or ordered. (1) Medical test (Echo, EKG, PFT/elena) were ordered. (1)History was not obtained from someone other than patient  (1) Old records  were reviewed - see HPI/MDM for pertinent details if review done. (2) Case wasdiscussed with another health care provider: Dr Baljit Sebastian  (2) Imaging was viewed by myself. (2) EKG  was viewed by myself.        The patient

## 2021-09-18 NOTE — ED PROVIDER NOTES
EMERGENCY DEPARTMENT PROVIDER NOTE    Patient Identification  Pt Name: Justine Khanna  MRN: [id number]  [place] 1990  Date of evaluation: 9/17/2021  Provider: Obey Cornelius DO  PCP: Tangela Anguiano    Chief Complaint  Seizures (3 witnessed seizures by patients father today, patient states she takes her seizure meds daily)      HPI  (History provided by patient)  This is a 32 y.o. female with pertinent past medical history of seizure disorder who was brought in by EMS transportation for seizure. Patient reports she is uncertain what happened to her today, tells me her dad told her she was having seizures. Currently she reports generalized body aches, most severe in her low back and chest.  Also reports dysuria. Nothing seems to make her symptoms any better or worse. Reports adherence to her Keppra with no missed doses. She denies any fevers, chills, vomiting, vision changes or trouble breathing. She denies any focal weakness. She denies any alcohol or recreational drug use. I did speak with patient's father on the phone, father states the patient had 3 separate seizures today, between the second and third episodes she was sluggish and falling asleep. ROS    Const:  No fevers, no chills, no generalized weakness  Skin:  No rash, no lesions  Eyes:  No visual changes, no blurry or double vision, no pain  ENT:  No sore throat, no difficulty swallowing, no ear pain, no sinus pain or congestion  Card:  +chest pain, no palpitations, no edema  Resp:  No shortness of breath, no cough, no wheezing  Abd:  No abdominal pain, no nausea, no vomiting, no diarrhea  Genitourinary:  +dysuria, no hematuria, no vaginal discharge, no vaginal bleeding  MSK:  +joint pain, +myalgia  Neuro:  No focal weakness, no headache, no paresthesia    All other systems reviewed and negative unless otherwise noted in HPI      I have reviewed the following nursing documentation:  Allergies: Patient has no known allergies.     Past medical history:   Past Medical History:   Diagnosis Date    Seizures ([facility] [address]     Onset at 6years old     Past surgical history: History reviewed. No pertinent surgical history. Home medications:   Current Discharge Medication List      CONTINUE these medications which have NOT CHANGED    Details   levETIRAcetam (KEPPRA) 750 MG tablet Take 2 tablets by mouth twice daily  Qty: 360 tablet, Refills: 1             Social history:  reports that she has been smoking. She has a 5.00 pack-year smoking history. She has never used smokeless tobacco. She reports previous alcohol use. She reports that she does not use drugs. Family history:  History reviewed. No pertinent family history. Exam  ED Triage Vitals   BP Temp Temp Source Pulse Resp SpO2 Height Weight   09/17/21 2224 09/17/21 2224 09/17/21 2224 09/17/21 2224 09/17/21 2224 09/17/21 2224 09/18/21 0221 09/18/21 0221   118/73 98.5 °F (36.9 °C) Oral 78 18 96 % 5' 7\" (1.702 m) 122 lb 1.6 oz (55.4 kg)     Nursing note and vitals reviewed. Constitutional: Well developed, well nourished. Non-toxic in appearance. HENT:      Head: Normocephalic and atraumatic. Ears: External ears normal.      Nose: Nose normal.     Mouth: Membrane mucosa dry and pink. Eyes: Anicteric sclera. No discharge. PERRL  Neck: Supple. Trachea midline. Cardiovascular: RRR; no murmurs, rubs, or gallops. Pulmonary/Chest: Effort normal. No respiratory distress. CTAB. No stridor. No wheezes. No rales. Abdominal: Soft. No distension. Nontender to deep palpation all quadrants  Musculoskeletal: Moves all extremities. No gross deformity. Neurological: Alert and orientedx4. Face symmetric. Speech is clear. CN 2-12 intact. 5/5 motor and sensation grossly intact all extremities. No pronator drift. Normal finger to nose, normal heel to shin. Downward Babinskis. Gait not tested  Skin: Warm and dry. No rash. Psychiatric: Flat affect.  Behavior is normal.    Procedures      EKG    EKG was reviewed by emergency department physician in the absence of a cardiologist    Narrow complex sinus rhythm, rate 83, rightward axis, normal NE and QRS intervals, normal Qtc, no ST elevations or depressions, normal t-wave morphology, impression NSR, no STEMI      Radiology  XR CHEST PORTABLE   Final Result   Stable chest with no acute abnormality seen         MRI brain without contrast    (Results Pending)       Labs  Results for orders placed or performed during the hospital encounter of 09/17/21   CBC Auto Differential   Result Value Ref Range    WBC 7.6 4.0 - 11.0 K/uL    RBC 3.79 (L) 4.00 - 5.20 M/uL    Hemoglobin 12.5 12.0 - 16.0 g/dL    Hematocrit 36.2 36.0 - 48.0 %    MCV 95.4 80.0 - 100.0 fL    MCH 33.0 26.0 - 34.0 pg    MCHC 34.6 31.0 - 36.0 g/dL    RDW 13.3 12.4 - 15.4 %    Platelets 998 796 - 116 K/uL    MPV 9.1 5.0 - 10.5 fL    Neutrophils % 66.9 %    Lymphocytes % 24.5 %    Monocytes % 4.8 %    Eosinophils % 3.2 %    Basophils % 0.6 %    Neutrophils Absolute 5.1 1.7 - 7.7 K/uL    Lymphocytes Absolute 1.9 1.0 - 5.1 K/uL    Monocytes Absolute 0.4 0.0 - 1.3 K/uL    Eosinophils Absolute 0.2 0.0 - 0.6 K/uL    Basophils Absolute 0.0 0.0 - 0.2 K/uL   Comprehensive Metabolic Panel w/ Reflex to MG   Result Value Ref Range    Sodium 138 136 - 145 mmol/L    Potassium reflex Magnesium 3.8 3.5 - 5.1 mmol/L    Chloride 106 99 - 110 mmol/L    CO2 26 21 - 32 mmol/L    Anion Gap 6 3 - 16    Glucose 88 70 - 99 mg/dL    BUN 4 (L) 7 - 20 mg/dL    CREATININE 0.6 0.6 - 1.1 mg/dL    GFR Non-African American >60 >60    GFR African American >60 >60    Calcium 8.0 (L) 8.3 - 10.6 mg/dL    Total Protein 5.0 (L) 6.4 - 8.2 g/dL    Albumin 3.3 (L) 3.4 - 5.0 g/dL    Albumin/Globulin Ratio 1.9 1.1 - 2.2    Total Bilirubin <0.2 0.0 - 1.0 mg/dL    Alkaline Phosphatase 70 40 - 129 U/L    ALT 12 10 - 40 U/L    AST 19 15 - 37 U/L    Globulin 1.7 g/dL   HCG Qualitative, Serum   Result Value Ref Range    hCG Qual Negative Detects HCG level >10 MIU/mL   Urinalysis Reflex to Culture    Specimen: Urine, clean catch   Result Value Ref Range    Color, UA YELLOW Straw/Yellow    Clarity, UA Clear Clear    Glucose, Ur Negative Negative mg/dL    Bilirubin Urine Negative Negative    Ketones, Urine Negative Negative mg/dL    Specific Gravity, UA 1.013 1.005 - 1.030    Blood, Urine Negative Negative    pH, UA 7.0 5.0 - 8.0    Protein, UA Negative Negative mg/dL    Urobilinogen, Urine 0.2 <2.0 E.U./dL    Nitrite, Urine Negative Negative    Leukocyte Esterase, Urine Negative Negative    Microscopic Examination Not Indicated     Urine Type Voided     Urine Reflex to Culture Not Indicated    Urine Drug Screen   Result Value Ref Range    Amphetamine Screen, Urine Neg Negative <1000ng/mL    Barbiturate Screen, Ur Neg Negative <200 ng/mL    Benzodiazepine Screen, Urine Neg Negative <200 ng/mL    Cannabinoid Scrn, Ur Neg Negative <50 ng/mL    Cocaine Metabolite Screen, Urine Neg Negative <300 ng/mL    Opiate Scrn, Ur Neg Negative <300 ng/mL    PCP Screen, Urine Neg Negative <25 ng/mL    Methadone Screen, Urine Neg Negative <300 ng/mL    Propoxyphene Scrn, Ur Neg Negative <300 ng/mL    Oxycodone Urine Neg Negative <100 ng/ml    pH, UA 7.0     Drug Screen Comment: see below    Levetiracetam level   Result Value Ref Range    KEPPRA Dose Amt Unknown    Troponin   Result Value Ref Range    Troponin <0.01 <0.01 ng/mL   CK   Result Value Ref Range    Total CK 85 26 - 192 U/L   Levetiracetam level   Result Value Ref Range    KEPPRA Dose Amt Unknown    EKG 12 Lead   Result Value Ref Range    Ventricular Rate 83 BPM    Atrial Rate 83 BPM    P-R Interval 152 ms    QRS Duration 70 ms    Q-T Interval 370 ms    QTc Calculation (Bazett) 434 ms    P Axis 79 degrees    R Axis 92 degrees    T Axis 74 degrees    Diagnosis       Normal sinus rhythmRightward axisSeptal infarct , age undetermined       Screenings  NIH Stroke Scale  Interval: Reassessment  Level of Consciousness (1a. ): Alert  LOC hemodynamically stable and free of seizure activity at time of admission. Final Impression  1. Status epilepticus (HCC)        Blood pressure 101/64, pulse 72, temperature 97.8 °F (36.6 °C), temperature source Oral, resp. rate 18, height 5' 7\" (1.702 m), weight 122 lb 1.6 oz (55.4 kg), SpO2 94 %. Disposition:  DISPOSITION Admitted 09/18/2021 01:07:46 AM      Patient Referrals:  No follow-up provider specified. Discharge Medications:  Current Discharge Medication List          Discontinued Medications:  Current Discharge Medication List          This chart was generated using the [address] [place] [address] dictation system. I created this record but it may contain dictation errors given the limitations of this technology.     Kay Linton DO (electronically signed)  Attending Emergency Physician       Kay Linton DO  09/18/21 0861

## 2021-09-18 NOTE — CONSULTS
In patient Neurology consult        [facility] Neurology      Jessie Amaro MD      [facility] [facility]  1990    Date of Service: 9/18/2021    Referring Physician: Amilcar Boyer MD      Reason for the consult and CC: Acute encephalopathy and breakthrough seizures. HPI:   The patient is a 32y.o.  years old female with known history of epilepsy who was admitted to the hospital last night with recurrent seizures. According to the patient who is not a good historian, she had 3 seizures yesterday. Description was brief motor seizure. Duration was minutes and  Degree was severe. No aura warning. No fever or chills. No other relieving or aggravating factors. She came to the ED for evaluation. Initial work-up with the CT and blood tests were unremarkable. UDS was negative. Keppra level was less than 5. She was admitted for medical management. Today she is awake and alert but waxing and waning. She had MRI of the brain which showed no acute findings. She does see Dr. Chitra Nelson for management of her seizures. Has known history of poor compliance. She was taking Keppra 1500 mg at home and at some point she was on Dilantin. Today she denies any other new symptoms. No fever chills or headache. Other review of system was unremarkable.     Family history:  none contributory    Interval history: Noncontributory    Past Medical History:   Diagnosis Date    Seizures ([address]     Onset at 6years old     Social History     Tobacco Use    Smoking status: Current Every Day Smoker     Packs/day: 0.50     Years: 10.00     Pack years: 5.00    Smokeless tobacco: Never Used   Substance Use Topics    Alcohol use: Not Currently    Drug use: Never     No Known Allergies  Current Facility-Administered Medications   Medication Dose Route Frequency Provider Last Rate Last Admin    levETIRAcetam (KEPPRA) tablet 1,500 mg  1,500 mg Oral BID Amilcar Boyer MD   1,500 mg at 09/18/21 0919    sodium chloride flush 0.9 % injection 10 mL  10 mL IntraVENous 2 times per day Andrea Jain MD   10 mL at 09/18/21 0955    sodium chloride flush 0.9 % injection 10 mL  10 mL IntraVENous PRN Andrea Jain MD        0.9 % sodium chloride infusion  25 mL IntraVENous PRN Andrea Jain MD        ondansetron (ZOFRAN-ODT) disintegrating tablet 4 mg  4 mg Oral Q8H PRN Andrea Jain MD        Or    [facility] injection 4 mg  4 mg IntraVENous Q6H PRN Andrea Jain MD        magnesium hydroxide (MILK OF MAGNESIA) 400 MG/5ML suspension 30 mL  30 mL Oral Daily PRN Andrea Jain MD        enoxaparin (LOVENOX) injection 40 mg  40 mg SubCUTAneous Daily Andrea Jain MD   40 mg at 09/18/21 0956    aspirin EC tablet 81 mg  81 mg Oral Daily Andrea Jain MD   81 mg at 09/18/21 3727    Or    aspirin suppository 300 mg  300 mg Rectal Daily Andrea Jain MD        labetalol (NORMODYNE;TRANDATE) injection 10 mg  10 mg IntraVENous Q10 Min PRN Andrea Jain MD        hydrALAZINE (APRESOLINE) injection 10 mg  10 mg IntraVENous Q6H PRN Andrea Jain MD        0.9 % sodium chloride bolus  500 mL IntraVENous PRN Andrea Jain MD        potassium chloride (KLOR-CON M) extended release tablet 40 mEq  40 mEq Oral PRN Andrea Jain MD        Or    potassium bicarb-citric acid (EFFER-K) effervescent tablet 40 mEq  40 mEq Oral PRN Andrea Jain MD        Or    potassium chloride 10 mEq/100 mL IVPB (Peripheral Line)  10 mEq IntraVENous PRN Andrea Jain MD           ROS : A 10-14 system review of constitutional, cardiovascular, respiratory, eyes, musculoskeletal, endocrine, GI, ENT, skin, hematological, genitourinary, psychiatric and neurologic systems was obtained and updated today and is unremarkable except as mentioned in my HPI      Exam:     Constitutional:   Vitals:    09/18/21 0200 09/18/21 0221 09/18/21 0453 09/18/21 0945   BP: 104/64  101/64 103/66   Pulse: 52  72 52   Resp: 16  18 16   Temp: 97.8 °F (36.6 °C)  97.8 °F (36.6 °C) 98.1 °F (36.7 °C)   TempSrc: Oral  Oral Oral   SpO2: 94%  94% 94%   Weight:  122 lb 1.6 oz (55.4 kg)     Height:  5' 7\" (1.702 m)         General appearance:  Normal development and appear in no acute distress. Eye:  Fundus of the eye: Funduscopic examination cannot be performed due to COVID19 restrictions and precautions. Neck: supple  Cardiovascular: No lower leg edema with good pulsation. Mental Status: Waxing and waning  Oriented to person, place, problem, and time. Memory: Good immediate recall. Intact remote memory  Poor attention span and concentration. Language: intact naming, repeating and fluency   Good fund of Knowledge. Aware of current events and vocabulary   Cranial Nerves:   II: Visual fields: Full. Pupils: equal, round, reactive to light  III,IV,VI: Extra Ocular Movements are intact. No nystagmus  V: Facial sensation is intact   VII: Facial strength and movements: intact and symmetric  VIII: Hearing: Intact  IX: Palate elevation is symmetric  XI: Shoulder shrug is intact  XII: Tongue movements are normal  Musculoskeletal: 5/5 in all 4 extremities. Tone: Normal tone.    Reflexes: Symmetric throughout  Plantars downgoing  No tremors or dysmetria normal sensation  Gait cannot be tested due to poor cooperation    Data:  LABS:   Lab Results   Component Value Date     09/17/2021    K 3.8 09/17/2021     09/17/2021    CO2 26 09/17/2021    BUN 4 09/17/2021    CREATININE 0.6 09/17/2021    GFRAA >60 09/17/2021    LABGLOM >60 09/17/2021    GLUCOSE 88 09/17/2021    CALCIUM 8.0 09/17/2021     Lab Results   Component Value Date    WBC 7.6 09/17/2021    RBC 3.79 09/17/2021    HGB 12.5 09/17/2021    HCT 36.2 09/17/2021    MCV 95.4 09/17/2021    RDW 13.3 09/17/2021     09/17/2021   No results found for: INR, PROTIME    Neuroimaging were independently reviewed by me and discussed results with the patient  Reviewed notes from different physicians  Reviewed lab

## 2021-09-18 NOTE — PROGRESS NOTES
Perfecte Sreve message to hospitalist as follows: Pt requesting D/C order for tonight, stated has a ride. Will leave AMA otherwise. Head MRI neg. Waiting for response.

## 2021-09-18 NOTE — PLAN OF CARE
Problem: SAFETY  Goal: Free from accidental physical injury  Outcome: Ongoing     Problem: DAILY CARE  Goal: Daily care needs are met  Outcome: Ongoing     Problem: SKIN INTEGRITY  Goal: Skin integrity is maintained or improved  Outcome: Ongoing     Problem: DISCHARGE BARRIERS  Goal: Patient's continuum of care needs are met  Outcome: Ongoing

## 2021-09-18 NOTE — PROGRESS NOTES
Vitals:   09/18/21 1250  BP: 97/60  Pulse: 60  Resp: 16  Temp: 98 °F (36.7 °C)  SpO2: 96%       Pt refused TELE, stated \"it's annoying, just tired and want to sleep\". Pt refused after education of importance of cardiac monitoring d/t history of bradycardia and arrhythmias, pt stated \"does not want it\".     Brain/head MRI in AM neg for CVA, pt refused NIHSS, will d/c d/t no evidence of CVA

## 2021-09-18 NOTE — PROGRESS NOTES
Patient is admitted to room 3376 from the emergency room. Patient has arrived to the floor at this time via stretcher and ambulated/transfered to bed. Oriented to room. Call light and bedside table within reach. Patient rates a medium fall risk. Denies further needs at this time. Will continue to monitor.  Alivia Donnelly

## 2021-09-19 LAB
KEPPRA DOSE AMT: NORMAL
KEPPRA: 44.3 UG/ML (ref 6–46)

## 2021-10-06 DIAGNOSIS — G40.219 PARTIAL SYMPTOMATIC EPILEPSY WITH COMPLEX PARTIAL SEIZURES, INTRACTABLE, WITHOUT STATUS EPILEPTICUS (HCC): Primary | ICD-10-CM

## 2021-10-06 RX ORDER — LEVETIRACETAM 750 MG/1
TABLET ORAL
Qty: 120 TABLET | Refills: 0 | Status: SHIPPED | OUTPATIENT
Start: 2021-10-06 | End: 2021-11-23 | Stop reason: SDUPTHER

## 2021-10-06 NOTE — TELEPHONE ENCOUNTER
Pt called in requesting a refill     Also she is requesting all OV notes sent to her     I stated to her that if she has them call I will make sure all information is sent over     [phone] fax number

## 2021-10-25 ENCOUNTER — TELEPHONE (OUTPATIENT)
Dept: FAMILY MEDICINE CLINIC | Age: 31
End: 2021-10-25

## 2021-10-25 ENCOUNTER — NURSE TRIAGE (OUTPATIENT)
Dept: OTHER | Facility: CLINIC | Age: 31
End: 2021-10-25

## 2021-10-25 NOTE — TELEPHONE ENCOUNTER
symptoms. Writer provided warm transfer to Carmela at [facility] for appointment scheduling. Attention Provider: Thank you for allowing me to participate in the care of your patient. The patient was connected to triage in response to information provided to the ECC/PSC. Please do not respond through this encounter as the response is not directed to a shared pool.

## 2021-11-23 ENCOUNTER — OFFICE VISIT (OUTPATIENT)
Dept: NEUROLOGY | Age: 31
End: 2021-11-23
Payer: COMMERCIAL

## 2021-11-23 ENCOUNTER — TELEPHONE (OUTPATIENT)
Dept: NEUROLOGY | Age: 31
End: 2021-11-23

## 2021-11-23 VITALS
SYSTOLIC BLOOD PRESSURE: 101 MMHG | HEIGHT: 67 IN | DIASTOLIC BLOOD PRESSURE: 70 MMHG | WEIGHT: 122 LBS | HEART RATE: 100 BPM | BODY MASS INDEX: 19.15 KG/M2

## 2021-11-23 DIAGNOSIS — G40.219 PARTIAL SYMPTOMATIC EPILEPSY WITH COMPLEX PARTIAL SEIZURES, INTRACTABLE, WITHOUT STATUS EPILEPTICUS (HCC): ICD-10-CM

## 2021-11-23 PROCEDURE — G8420 CALC BMI NORM PARAMETERS: HCPCS | Performed by: PSYCHIATRY & NEUROLOGY

## 2021-11-23 PROCEDURE — 99213 OFFICE O/P EST LOW 20 MIN: CPT | Performed by: PSYCHIATRY & NEUROLOGY

## 2021-11-23 PROCEDURE — 4004F PT TOBACCO SCREEN RCVD TLK: CPT | Performed by: PSYCHIATRY & NEUROLOGY

## 2021-11-23 PROCEDURE — G8427 DOCREV CUR MEDS BY ELIG CLIN: HCPCS | Performed by: PSYCHIATRY & NEUROLOGY

## 2021-11-23 PROCEDURE — G8484 FLU IMMUNIZE NO ADMIN: HCPCS | Performed by: PSYCHIATRY & NEUROLOGY

## 2021-11-23 RX ORDER — LEVETIRACETAM 750 MG/1
TABLET ORAL
Qty: 120 TABLET | Refills: 5 | Status: SHIPPED | OUTPATIENT
Start: 2021-11-23 | End: 2022-04-29 | Stop reason: SDUPTHER

## 2021-11-23 NOTE — TELEPHONE ENCOUNTER
Pt just had appt today and shes calling back to let us know that she did NOT have a seizure 2-3 weeks ago

## 2021-11-23 NOTE — PROGRESS NOTES
[facility]   Neurology followup    Subjective:   CC/HP  History was obtained from the patient. Apparently patient's mother said that she might have had a brief seizure about 3 weeks ago. Patient is not sure about this. Otherwise she has been doing very well without any other seizures since her last office visit. She has been taking her medications regularly. Detailed history:. Patient has known uncontrolled seizures. Patient started having seizures when she was 6years old. She lived for a long time in [address] [place] [address]. She apparently was followed by a neurologist there. She was on Dilantin initially and then switched to [address]. Patient states that most of the time she has been taking her Keppra regularly but there are occasions when she would come to the emergency room with a breakthrough seizure and a Keppra level would be undetectable. She will be loaded with IV Keppra and sent back. She then was admitted to [facility] in December 2020. Again Keppra level was subtherapeutic. She had an MRI brain which did not show any abnormalities. EEG was normal as well. Subsequently because of frequent seizures on Keppra dose has been increased and now she is taking Keppra  1500 mg twice daily  Patient admits to being under increased stress since that she is  from her children. Patient states that she feels cold all the time    REVIEW OF SYSTEMS    Constitutional:  [x]   Chills   [x]  Fatigue   []  Fevers   []  Malaise   []  Weight loss     [] Denies all of the above    Respiratory:   []  Cough    []  Shortness of breath         [x] Denies all of the above     Cardiovascular:   []  Chest pain    []  Exertional chest pressure/discomfort           [] Palpitations    [x]  Syncope     [] Denies all of the above        Past Medical History:   Diagnosis Date    Seiz[address] [address]     Onset at 6years old     History reviewed. No pertinent family history.   Social History     Socioeconomic History    Marital status: Single     Spouse name: None    Number of children: None    Years of education: None    Highest education level: None   Occupational History    None   Tobacco Use    Smoking status: Current Every Day Smoker     Packs/day: 0.50     Years: 10.00     Pack years: 5.00    Smokeless tobacco: Never Used   Substance and Sexual Activity    Alcohol use: Not Currently    Drug use: Never    Sexual activity: None   Other Topics Concern    None   Social History Narrative    None     Social Determinants of Health     Financial Resource Strain:     Difficulty of Paying Living Expenses: Not on file   Food Insecurity:     Worried About Running Out of Food in the Last Year: Not on file    Lucia of Food in the Last Year: Not on file   Transportation Needs:     Lack of Transportation (Medical): Not on file    Lack of Transportation (Non-Medical):  Not on file   Physical Activity:     Days of Exercise per Week: Not on file    Minutes of Exercise per Session: Not on file   Stress:     Feeling of Stress : Not on file   Social Connections:     Frequency of Communication with Friends and Family: Not on file    Frequency of Social Gatherings with Friends and Family: Not on file    Attends [employer] Services: Not on file    Active Member of [address] [place] [address] or Organizations: Not on file    Attends Club or Organization Meetings: Not on file    Marital Status: Not on file   Intimate Partner Violence:     Fear of Current or Ex-Partner: Not on file    Emotionally Abused: Not on file    Physically Abused: Not on file    Sexually Abused: Not on file   Housing Stability:     Unable to Pay for Housing in the Last Year: Not on file    Number of [place] in the Last Year: Not on file    Unstable Housing in the Last Year: Not on file        Objective:  Exam:  /70   Pulse 100   Ht 5' 7\" (1.702 m)   Wt 122 lb (55.3 kg)   BMI 19.11 kg/m²   This is a well-nourished patient in no acute distress  Patient is awake, alert and oriented x3. Speech is normal.  Pupils are equal round reacting to light. Extraocular movements intact. Face symmetrical. Tongue midline. Motor exam shows normal symmetrical strength. Deep tendon reflexes normal. Plantar reflexes downgoing. Sensory exam normal. Coordination normal. Gait normal. No carotid bruit. No neck stiffness. Data :  LABS:  General Labs:    CBC:   Lab Results   Component Value Date    WBC 7.6 09/17/2021    RBC 3.79 09/17/2021    HGB 12.5 09/17/2021    HCT 36.2 09/17/2021    MCV 95.4 09/17/2021    MCH 33.0 09/17/2021    MCHC 34.6 09/17/2021    RDW 13.3 09/17/2021     09/17/2021    MPV 9.1 09/17/2021     BMP:    Lab Results   Component Value Date     09/17/2021    K 3.8 09/17/2021     09/17/2021    CO2 26 09/17/2021    BUN 4 09/17/2021    LABALBU 3.3 09/17/2021    CREATININE 0.6 09/17/2021    CALCIUM 8.0 09/17/2021    GFRAA >60 09/17/2021    LABGLOM >60 09/17/2021    GLUCOSE 88 09/17/2021     RADIOLOGY REVIEW:  I have reviewed radiology reports(s) of: MRI brain    Impression :  Intractable seizure disorder  There may be some element of nonepileptic seizures from stress as well  Patient's Keppra level was checked and was therapeutic at 6.9      Plan :  Discussed with patient  Continue Keppra 1500 mg twice daily  If he is doing well, I will see her back in 6 months for follow-up      Please note a portion of  this chart was generated using dragon dictation software. Although every effort was made to ensure the accuracy of this automated transcription, some errors in transcription may have occurred.

## 2022-04-29 DIAGNOSIS — G40.219 PARTIAL SYMPTOMATIC EPILEPSY WITH COMPLEX PARTIAL SEIZURES, INTRACTABLE, WITHOUT STATUS EPILEPTICUS (HCC): ICD-10-CM

## 2022-04-29 RX ORDER — LEVETIRACETAM 750 MG/1
TABLET ORAL
Qty: 120 TABLET | Refills: 0 | Status: SHIPPED | OUTPATIENT
Start: 2022-04-29 | End: 2022-10-04 | Stop reason: SDUPTHER

## 2022-04-29 NOTE — TELEPHONE ENCOUNTER
Pt called in for refill on Keppra 750mg 2 tabs bid. LOV 11/23/21  Next appt 5/23/21  Medication pended.     Pt can be reached at [phone]

## 2022-10-04 ENCOUNTER — APPOINTMENT (OUTPATIENT)
Dept: CT IMAGING | Age: 32
End: 2022-10-04

## 2022-10-04 ENCOUNTER — HOSPITAL ENCOUNTER (EMERGENCY)
Age: 32
Discharge: HOME | End: 2022-10-04
Payer: MEDICAID

## 2022-10-04 VITALS
SYSTOLIC BLOOD PRESSURE: 120 MMHG | RESPIRATION RATE: 15 BRPM | WEIGHT: 124 LBS | HEART RATE: 91 BPM | BODY MASS INDEX: 19.46 KG/M2 | HEIGHT: 67 IN | OXYGEN SATURATION: 95 % | TEMPERATURE: 98.7 F | DIASTOLIC BLOOD PRESSURE: 68 MMHG

## 2022-10-04 DIAGNOSIS — G40.219 PARTIAL SYMPTOMATIC EPILEPSY WITH COMPLEX PARTIAL SEIZURES, INTRACTABLE, WITHOUT STATUS EPILEPTICUS (HCC): ICD-10-CM

## 2022-10-04 DIAGNOSIS — G40.919 BREAKTHROUGH SEIZURE (HCC): Primary | ICD-10-CM

## 2022-10-04 LAB
ANION GAP SERPL CALCULATED.3IONS-SCNC: 11 MMOL/L (ref 3–16)
BASOPHILS ABSOLUTE: 0 K/UL (ref 0–0.2)
BASOPHILS RELATIVE PERCENT: 0.5 %
BUN BLDV-MCNC: 6 MG/DL (ref 7–20)
CALCIUM SERPL-MCNC: 9.7 MG/DL (ref 8.3–10.6)
CHLORIDE BLD-SCNC: 105 MMOL/L (ref 99–110)
CO2: 25 MMOL/L (ref 21–32)
CREAT SERPL-MCNC: 0.7 MG/DL (ref 0.6–1.1)
EOSINOPHILS ABSOLUTE: 0.3 K/UL (ref 0–0.6)
EOSINOPHILS RELATIVE PERCENT: 5.6 %
GFR AFRICAN AMERICAN: >60
GFR NON-AFRICAN AMERICAN: >60
GLUCOSE BLD-MCNC: 93 MG/DL (ref 70–99)
HCG QUALITATIVE: NEGATIVE
HCT VFR BLD CALC: 44.4 % (ref 36–48)
HEMOGLOBIN: 14.6 G/DL (ref 12–16)
LYMPHOCYTES ABSOLUTE: 1.9 K/UL (ref 1–5.1)
LYMPHOCYTES RELATIVE PERCENT: 35.7 %
MCH RBC QN AUTO: 31.8 PG (ref 26–34)
MCHC RBC AUTO-ENTMCNC: 33 G/DL (ref 31–36)
MCV RBC AUTO: 96.4 FL (ref 80–100)
MONOCYTES ABSOLUTE: 0.3 K/UL (ref 0–1.3)
MONOCYTES RELATIVE PERCENT: 5.3 %
NEUTROPHILS ABSOLUTE: 2.8 K/UL (ref 1.7–7.7)
NEUTROPHILS RELATIVE PERCENT: 52.9 %
PDW BLD-RTO: 13.1 % (ref 12.4–15.4)
PLATELET # BLD: 181 K/UL (ref 135–450)
PMV BLD AUTO: 8.7 FL (ref 5–10.5)
POTASSIUM SERPL-SCNC: 4.4 MMOL/L (ref 3.5–5.1)
RBC # BLD: 4.6 M/UL (ref 4–5.2)
SODIUM BLD-SCNC: 141 MMOL/L (ref 136–145)
WBC # BLD: 5.3 K/UL (ref 4–11)

## 2022-10-04 PROCEDURE — 6360000002 HC RX W HCPCS: Performed by: PHYSICIAN ASSISTANT

## 2022-10-04 PROCEDURE — 6370000000 HC RX 637 (ALT 250 FOR IP): Performed by: PHYSICIAN ASSISTANT

## 2022-10-04 PROCEDURE — 99284 EMERGENCY DEPT VISIT MOD MDM: CPT

## 2022-10-04 PROCEDURE — 70450 CT HEAD/BRAIN W/O DYE: CPT

## 2022-10-04 PROCEDURE — 2580000003 HC RX 258: Performed by: PHYSICIAN ASSISTANT

## 2022-10-04 PROCEDURE — 85025 COMPLETE CBC W/AUTO DIFF WBC: CPT

## 2022-10-04 PROCEDURE — 80048 BASIC METABOLIC PNL TOTAL CA: CPT

## 2022-10-04 PROCEDURE — 84703 CHORIONIC GONADOTROPIN ASSAY: CPT

## 2022-10-04 PROCEDURE — 93005 ELECTROCARDIOGRAM TRACING: CPT | Performed by: PHYSICIAN ASSISTANT

## 2022-10-04 PROCEDURE — 96365 THER/PROPH/DIAG IV INF INIT: CPT

## 2022-10-04 PROCEDURE — 96375 TX/PRO/DX INJ NEW DRUG ADDON: CPT

## 2022-10-04 PROCEDURE — 36415 COLL VENOUS BLD VENIPUNCTURE: CPT

## 2022-10-04 RX ORDER — 0.9 % SODIUM CHLORIDE 0.9 %
1000 INTRAVENOUS SOLUTION INTRAVENOUS ONCE
Status: COMPLETED | OUTPATIENT
Start: 2022-10-04 | End: 2022-10-04

## 2022-10-04 RX ORDER — LEVETIRACETAM 750 MG/1
TABLET ORAL
Qty: 30 TABLET | Refills: 0 | Status: SHIPPED | OUTPATIENT
Start: 2022-10-04

## 2022-10-04 RX ORDER — KETOROLAC TROMETHAMINE 30 MG/ML
30 INJECTION, SOLUTION INTRAMUSCULAR; INTRAVENOUS ONCE
Status: COMPLETED | OUTPATIENT
Start: 2022-10-04 | End: 2022-10-04

## 2022-10-04 RX ORDER — LEVETIRACETAM 10 MG/ML
1000 INJECTION INTRAVASCULAR ONCE
Status: COMPLETED | OUTPATIENT
Start: 2022-10-04 | End: 2022-10-04

## 2022-10-04 RX ORDER — ACETAMINOPHEN 500 MG
1000 TABLET ORAL ONCE
Status: COMPLETED | OUTPATIENT
Start: 2022-10-04 | End: 2022-10-04

## 2022-10-04 RX ADMIN — SODIUM CHLORIDE 1000 ML: 9 INJECTION, SOLUTION INTRAVENOUS at 19:10

## 2022-10-04 RX ADMIN — KETOROLAC TROMETHAMINE 30 MG: 30 INJECTION, SOLUTION INTRAMUSCULAR at 21:21

## 2022-10-04 RX ADMIN — ACETAMINOPHEN 1000 MG: 500 TABLET ORAL at 19:10

## 2022-10-04 RX ADMIN — LEVETIRACETAM 1000 MG: 10 INJECTION INTRAVENOUS at 19:11

## 2022-10-04 ASSESSMENT — PAIN DESCRIPTION - ORIENTATION: ORIENTATION: ANTERIOR;RIGHT;LEFT;POSTERIOR

## 2022-10-04 ASSESSMENT — PAIN - FUNCTIONAL ASSESSMENT
PAIN_FUNCTIONAL_ASSESSMENT: ACTIVITIES ARE NOT PREVENTED
PAIN_FUNCTIONAL_ASSESSMENT: 0-10

## 2022-10-04 ASSESSMENT — ENCOUNTER SYMPTOMS
VOMITING: 0
DIARRHEA: 0
SHORTNESS OF BREATH: 0
NAUSEA: 0
ABDOMINAL PAIN: 0
COUGH: 0
RHINORRHEA: 0

## 2022-10-04 ASSESSMENT — PAIN DESCRIPTION - DESCRIPTORS
DESCRIPTORS: ACHING
DESCRIPTORS: ACHING

## 2022-10-04 ASSESSMENT — PAIN SCALES - GENERAL
PAINLEVEL_OUTOF10: 7
PAINLEVEL_OUTOF10: 2
PAINLEVEL_OUTOF10: 5
PAINLEVEL_OUTOF10: 7

## 2022-10-04 ASSESSMENT — PAIN DESCRIPTION - LOCATION
LOCATION: HEAD

## 2022-10-04 ASSESSMENT — PAIN DESCRIPTION - ONSET: ONSET: ON-GOING

## 2022-10-04 ASSESSMENT — PAIN DESCRIPTION - PAIN TYPE: TYPE: ACUTE PAIN

## 2022-10-04 NOTE — ED PROVIDER NOTES
[facility]        Pt Name: Maikol Dietz  MRN: [id number]  [place] 1990  Date of evaluation: 10/4/2022  Provider: Divine Martins PA-C  PCP: Rakan Welch MD  Note Started: 7:21 PM EDT       DON. I have evaluated this patient. My supervising physician was available for consultation. CHIEF COMPLAINT       Chief Complaint   Patient presents with    Seizures     Pt brought in by  EMS from home due to having 3 seizures today, 9/11/on the way to the hospital, Pt A and O x 4, states that she has been out of her medication for the past few days, does not want to stay in the hospital.         HISTORY OF PRESENT ILLNESS   (Location, Timing/Onset, Context/Setting, Quality, Duration, Modifying Factors, Severity, Associated Signs and Symptoms)  Note limiting factors. Chief Complaint: Seizure    Maikol Dietz is a 28 y.o. female who presents to the emergency department today for evaluation for a seizure. The patient is from home, and reports that her parents did call EMS. The patient does have a history of seizures however she states that she recently got out of prison, and she states that she has ran out of her Keppra for the past several days. The patient states that she remembers going into her room, she states that she was lying down on the bed, family came into check on her and noticed a seizure. EMS was called. Patient apparently had another seizure in route to EMS. The patient arrives to the ED she is complaining of a mild headache, is unsure if she may have fallen or hit her head. She otherwise has no visual changes. She has no numbness, tingling or weakness. She has no chest pain. She has no shortness of breath. She has no fevers, or recent illnesses. Denies any dizzy or lightheaded, no other complaints. Nursing Notes were all reviewed and agreed with or any disagreements were addressed in the HPI.     REVIEW OF SYSTEMS    (2-9 systems for level 4, 10 or more for level 5)     Review of Systems   Constitutional:  Negative for activity change, appetite change, chills and fever. HENT:  Negative for congestion and rhinorrhea. Respiratory:  Negative for cough and shortness of breath. Cardiovascular:  Negative for chest pain. Gastrointestinal:  Negative for abdominal pain, diarrhea, nausea and vomiting. Genitourinary:  Negative for difficulty urinating, dysuria and hematuria. Neurological:  Positive for seizures. Negative for weakness and numbness. Positives and Pertinent negatives as per HPI. Except as noted above in the ROS, all other systems were reviewed and negative. PAST MEDICAL HISTORY     Past Medical History:   Diagnosis Date    Seizures ([address]     Onset at 6years old         SURGICAL HISTORY   History reviewed. No pertinent surgical history. [address]       Current Discharge Medication List            ALLERGIES     Patient has no known allergies. FAMILYHISTORY     History reviewed. No pertinent family history. SOCIAL HISTORY       Social History     Tobacco Use    Smoking status: Every Day     Packs/day: 0.50     Years: 10.00     Pack years: 5.00     Types: Cigarettes    Smokeless tobacco: Never   Substance Use Topics    Alcohol use: Not Currently    Drug use: Never       SCREENINGS    Ashley Coma Scale  Eye Opening: Spontaneous  Best Verbal Response: Oriented  Best Motor Response: Obeys commands  Ashley Coma Scale Score: 15        PHYSICAL EXAM    (up to 7 for level 4, 8 or more for level 5)     ED Triage Vitals [10/04/22 1813]   BP Temp Temp Source Heart Rate Resp SpO2 Height Weight   (!) 132/99 98.7 °F (37.1 °C) Oral 99 20 98 % 5' 7\" (1.702 m) 124 lb (56.2 kg)       Physical Exam  Vitals and nursing note reviewed. Constitutional:       Appearance: She is well-developed. She is not diaphoretic. HENT:      Head: Normocephalic and atraumatic.       Right Ear: External ear normal.      Left Ear: External ear normal.      Nose: Nose normal.      Mouth/Throat:      Mouth: Mucous membranes are moist.      Pharynx: Oropharynx is clear. No posterior oropharyngeal erythema. Eyes:      General:         Right eye: No discharge. Left eye: No discharge. Conjunctiva/sclera: Conjunctivae normal.      Pupils: Pupils are equal, round, and reactive to light. Neck:      Trachea: No tracheal deviation. Cardiovascular:      Rate and Rhythm: Normal rate and regular rhythm. Heart sounds: No murmur heard. Pulmonary:      Effort: Pulmonary effort is normal. No respiratory distress. Breath sounds: Normal breath sounds. No wheezing or rales. Abdominal:      General: Bowel sounds are normal. There is no distension. Palpations: Abdomen is soft. Tenderness: There is no abdominal tenderness. There is no guarding or rebound. Musculoskeletal:         General: Normal range of motion. Cervical back: Normal range of motion and neck supple. Skin:     General: Skin is warm and dry. Neurological:      General: No focal deficit present. Mental Status: She is alert and oriented to person, place, and time. GCS: GCS eye subscore is 4. GCS verbal subscore is 5. GCS motor subscore is 6. Cranial Nerves: Cranial nerves 2-12 are intact. Sensory: Sensation is intact. Motor: Motor function is intact. Coordination: Coordination is intact. Psychiatric:         Behavior: Behavior normal.       DIAGNOSTIC RESULTS   LABS:    Labs Reviewed   BASIC METABOLIC PANEL - Abnormal; Notable for the following components:       Result Value    BUN 6 (*)     All other components within normal limits   CBC WITH AUTO DIFFERENTIAL   HCG, SERUM, QUALITATIVE       When ordered only abnormal lab results are displayed. All other labs were within normal range or not returned as of this dictation. EKG:  When ordered, EKG's are interpreted by the Emergency Department Physician in the absence of a cardiologist.  Please see their note for interpretation of EKG. RADIOLOGY:   Non-plain film images such as CT, Ultrasound and MRI are read by the radiologist. Plain radiographic images are visualized and preliminarily interpreted by the ED Provider with the below findings:        Interpretation per the Radiologist below, if available at the time of this note:    CT HEAD WO CONTRAST   Final Result   No acute intracranial abnormality. No results found. PROCEDURES   Unless otherwise noted below, none     Procedures    CRITICAL CARE TIME       CONSULTS:  None      EMERGENCY DEPARTMENT COURSE and DIFFERENTIAL DIAGNOSIS/MDM:   Vitals:    Vitals:    10/04/22 2000 10/04/22 2015 10/04/22 2030 10/04/22 2045   BP: 107/75 116/77 121/75 121/78   Pulse: 91 85 90 91   Resp: 20 20 20 18   Temp:       TempSrc:       SpO2: 97% 95% 94% 95%   Weight:       Height:           Patient was given the following medications:  Medications   0.9 % sodium chloride bolus (1,000 mLs IntraVENous New Bag 10/4/22 1910)   levETIRAcetam (KEPPRA) 1000 mg/100 mL IVPB (0 mg IntraVENous Stopped 10/4/22 1930)   acetaminophen (TYLENOL) tablet 1,000 mg (1,000 mg Oral Given 10/4/22 1910)         Is this patient to be included in the SEP-1 Core Measure due to severe sepsis or septic shock? No   Exclusion criteria - the patient is NOT to be included for SEP-1 Core Measure due to: Infection is not suspected    Briefly, this is a 78-year-old female who presents to the emergency department today for evaluation for a breakthrough seizure. Patient states that she does have a history of seizures however she recently got out of retirement, and she states that she has not had her medicine for the past 2 days. Patient is unsure if she may have fallen or hit her head. On examination, there are no neurological deficits. EKG will be documented by my attending, please see his note for further details.   CBC shows no evidence of leukocytosis or anemia. BMP unremarkable. Pregnancy is negative. Due to potential head injury, CT of head was obtained, which was negative. Patient was given fluids as well as loading dose of Keppra here, she has been observed for 3 hours, otherwise not had any seizure-like activity. Do feel that she is having breakthrough seizures for not having her medicine. We will refill her Keppra. I recommend follow-up with her primary care physician within 2 to 3 days. She is to return to the ED for any new or worsening symptoms. Patient was understanding is agreeable with plan. Stable for discharge.    Results for orders placed or performed during the hospital encounter of 10/04/22   CBC with Auto Differential   Result Value Ref Range    WBC 5.3 4.0 - 11.0 K/uL    RBC 4.60 4.00 - 5.20 M/uL    Hemoglobin 14.6 12.0 - 16.0 g/dL    Hematocrit 44.4 36.0 - 48.0 %    MCV 96.4 80.0 - 100.0 fL    MCH 31.8 26.0 - 34.0 pg    MCHC 33.0 31.0 - 36.0 g/dL    RDW 13.1 12.4 - 15.4 %    Platelets 324 821 - 876 K/uL    MPV 8.7 5.0 - 10.5 fL    Neutrophils % 52.9 %    Lymphocytes % 35.7 %    Monocytes % 5.3 %    Eosinophils % 5.6 %    Basophils % 0.5 %    Neutrophils Absolute 2.8 1.7 - 7.7 K/uL    Lymphocytes Absolute 1.9 1.0 - 5.1 K/uL    Monocytes Absolute 0.3 0.0 - 1.3 K/uL    Eosinophils Absolute 0.3 0.0 - 0.6 K/uL    Basophils Absolute 0.0 0.0 - 0.2 K/uL   Basic Metabolic Panel   Result Value Ref Range    Sodium 141 136 - 145 mmol/L    Potassium 4.4 3.5 - 5.1 mmol/L    Chloride 105 99 - 110 mmol/L    CO2 25 21 - 32 mmol/L    Anion Gap 11 3 - 16    Glucose 93 70 - 99 mg/dL    BUN 6 (L) 7 - 20 mg/dL    Creatinine 0.7 0.6 - 1.1 mg/dL    GFR Non-African American >60 >60    GFR African American >60 >60    Calcium 9.7 8.3 - 10.6 mg/dL   HCG Qualitative, Serum   Result Value Ref Range    hCG Qual Negative Detects HCG level >10 MIU/mL   EKG 12 Lead   Result Value Ref Range    Ventricular Rate 94 BPM    Atrial Rate 94 BPM    P-R Interval 144 ms    QRS Duration 74 ms    Q-T Interval 356 ms    QTc Calculation (Bazett) 445 ms    P Axis 81 degrees    R Axis 94 degrees    T Axis 61 degrees    Diagnosis Normal sinus rhythmRightward axisBorderline ECG        I estimate there is LOW risk for SUBARACHNOID HEMORRHAGE, MENINGITIS, INTRACRANIAL HEMORRHAGE, SUBDURAL HEMATOMA, OR STROKE, thus I consider the discharge disposition reasonable. Maritza Parikh and I have discussed the diagnosis and risks, and we agree with discharging home to follow-up with their primary doctor. We also discussed returning to the Emergency Department immediately if new or worsening symptoms occur. We have discussed the symptoms which are most concerning (e.g., changing or worsening pain, weakness, vomiting, fever) that necessitate immediate return. FINAL IMPRESSION      1.  Breakthrough seizure ([address]    2. Partial symptomatic epilepsy with complex partial seizures, intractable, without status epilepticus [facility])          DISPOSITION/PLAN   DISPOSITION Decision To Discharge 10/04/2022 08:28:54 PM      PATIENT REFERRED TO:  Kris Patel MD  [address]  [address] [facility]  [phone]    Schedule an appointment as soon as possible for a visit in 2 days      [facility] Emergency Department  [address] [facility]  [phone]    As needed, If symptoms worsen    DISCHARGE MEDICATIONS:  Current Discharge Medication List          DISCONTINUED MEDICATIONS:  Current Discharge Medication List                 (Please note that portions of this note were completed with a voice recognition program.  Efforts were made to edit the dictations but occasionally words are mis-transcribed.)    Andi Bird PA-C (electronically signed)              Andi Bird PA-C  10/04/22 2051

## 2022-10-05 NOTE — ED NOTES
Pt d/c'd home. Removed 22G IV and stopped bleeding. Catheter intact. Pt tolerated well. No redness noted at site. NReviewed d/c instructions, home meds, and  f/u information utilizing teach-back method. Scripts for keppra given to patient's pharmacy. Patient verbalized understanding.          Faith Alvarez RN  10/04/22 2432

## 2022-10-06 LAB
EKG ATRIAL RATE: 94 BPM
EKG DIAGNOSIS: NORMAL
EKG P AXIS: 81 DEGREES
EKG P-R INTERVAL: 144 MS
EKG Q-T INTERVAL: 356 MS
EKG QRS DURATION: 74 MS
EKG QTC CALCULATION (BAZETT): 445 MS
EKG R AXIS: 94 DEGREES
EKG T AXIS: 61 DEGREES
EKG VENTRICULAR RATE: 94 BPM

## 2022-10-06 PROCEDURE — 93010 ELECTROCARDIOGRAM REPORT: CPT | Performed by: INTERNAL MEDICINE

## 2022-10-24 ENCOUNTER — TELEPHONE (OUTPATIENT)
Dept: NEUROLOGY | Age: 32
End: 2022-10-24

## 2022-11-17 DIAGNOSIS — G40.219 PARTIAL SYMPTOMATIC EPILEPSY WITH COMPLEX PARTIAL SEIZURES, INTRACTABLE, WITHOUT STATUS EPILEPTICUS (HCC): ICD-10-CM

## 2022-11-18 RX ORDER — LEVETIRACETAM 750 MG/1
TABLET ORAL
Qty: 120 TABLET | Refills: 0 | Status: SHIPPED | OUTPATIENT
Start: 2022-11-18

## 2022-12-26 ENCOUNTER — HOSPITAL ENCOUNTER (EMERGENCY)
Age: 32
Discharge: HOME | End: 2022-12-26
Attending: EMERGENCY MEDICINE
Payer: COMMERCIAL

## 2022-12-26 VITALS
HEART RATE: 96 BPM | RESPIRATION RATE: 18 BRPM | WEIGHT: 125 LBS | BODY MASS INDEX: 19.62 KG/M2 | OXYGEN SATURATION: 90 % | TEMPERATURE: 96.9 F | DIASTOLIC BLOOD PRESSURE: 74 MMHG | HEIGHT: 67 IN | SYSTOLIC BLOOD PRESSURE: 116 MMHG

## 2022-12-26 DIAGNOSIS — G40.219 PARTIAL SYMPTOMATIC EPILEPSY WITH COMPLEX PARTIAL SEIZURES, INTRACTABLE, WITHOUT STATUS EPILEPTICUS (HCC): ICD-10-CM

## 2022-12-26 DIAGNOSIS — G40.919 BREAKTHROUGH SEIZURE (HCC): Primary | ICD-10-CM

## 2022-12-26 LAB
ANION GAP SERPL CALCULATED.3IONS-SCNC: 9 MMOL/L (ref 3–16)
BUN BLDV-MCNC: 11 MG/DL (ref 7–20)
CALCIUM SERPL-MCNC: 9.6 MG/DL (ref 8.3–10.6)
CHLORIDE BLD-SCNC: 101 MMOL/L (ref 99–110)
CO2: 24 MMOL/L (ref 21–32)
CREAT SERPL-MCNC: 0.6 MG/DL (ref 0.6–1.1)
GFR SERPL CREATININE-BSD FRML MDRD: >60 ML/MIN/{1.73_M2}
GLUCOSE BLD-MCNC: 83 MG/DL (ref 70–99)
GONADOTROPIN, CHORIONIC (HCG) QUANT: <5 MIU/ML
MAGNESIUM: 1.9 MG/DL (ref 1.8–2.4)
PHOSPHORUS: 2.9 MG/DL (ref 2.5–4.9)
POTASSIUM SERPL-SCNC: 4.8 MMOL/L (ref 3.5–5.1)
SODIUM BLD-SCNC: 134 MMOL/L (ref 136–145)

## 2022-12-26 PROCEDURE — 80177 DRUG SCRN QUAN LEVETIRACETAM: CPT

## 2022-12-26 PROCEDURE — 83735 ASSAY OF MAGNESIUM: CPT

## 2022-12-26 PROCEDURE — 99283 EMERGENCY DEPT VISIT LOW MDM: CPT

## 2022-12-26 PROCEDURE — 6370000000 HC RX 637 (ALT 250 FOR IP): Performed by: EMERGENCY MEDICINE

## 2022-12-26 PROCEDURE — 84702 CHORIONIC GONADOTROPIN TEST: CPT

## 2022-12-26 PROCEDURE — 84100 ASSAY OF PHOSPHORUS: CPT

## 2022-12-26 PROCEDURE — 80048 BASIC METABOLIC PNL TOTAL CA: CPT

## 2022-12-26 PROCEDURE — 36415 COLL VENOUS BLD VENIPUNCTURE: CPT

## 2022-12-26 RX ORDER — LEVETIRACETAM 500 MG/1
1000 TABLET ORAL ONCE
Status: COMPLETED | OUTPATIENT
Start: 2022-12-26 | End: 2022-12-26

## 2022-12-26 RX ORDER — LEVETIRACETAM 750 MG/1
1500 TABLET ORAL 2 TIMES DAILY
Qty: 120 TABLET | Refills: 1 | Status: SHIPPED | OUTPATIENT
Start: 2022-12-26 | End: 2023-01-25

## 2022-12-26 RX ADMIN — LEVETIRACETAM 1000 MG: 500 TABLET, FILM COATED ORAL at 18:09

## 2022-12-26 ASSESSMENT — PAIN - FUNCTIONAL ASSESSMENT: PAIN_FUNCTIONAL_ASSESSMENT: NONE - DENIES PAIN

## 2022-12-27 LAB
KEPPRA DOSE AMT: ABNORMAL
KEPPRA: <2 UG/ML (ref 6–46)

## 2022-12-27 RX ORDER — LEVETIRACETAM 750 MG/1
TABLET ORAL
Qty: 120 TABLET | Refills: 0 | OUTPATIENT
Start: 2022-12-27

## 2022-12-27 NOTE — DISCHARGE INSTRUCTIONS
Your prescription has been sent to [facility]. Be sure to contact your primary care provider's office to arrange for a follow up visit. If you have any new or worsening issues after going home don't hesitate to return here for reevaluation at any time 24/7!

## 2023-01-07 ASSESSMENT — ENCOUNTER SYMPTOMS
ABDOMINAL PAIN: 0
VOMITING: 0
SHORTNESS OF BREATH: 0
NAUSEA: 0

## 2023-01-07 NOTE — ED PROVIDER NOTES
[address]    Name: Irlanda Mueller : 1990 MRN: [id number] Date of Service: 2022    /74   Pulse 96   Temp 96.9 °F (36.1 °C) (Oral)   Resp 18   Ht 5' 7\" (1.702 m)   Wt 125 lb (56.7 kg)   SpO2 90%   BMI 19.58 kg/m²     CC: seizure    HPI: this patient is a 35 y.o. female presenting to the ED from home via EMS. This afternoon Ms. Mary Menchaca was at home with family members when her parents reportedly witnessed her having a seizure that appeared to be affecting her whole body, lasting just a very brief period of time. They called 911 for assistance, EMS personnel arrived to their home, and they brought Ms. Mary Menchaca here to be evaluated. Ms. Mary Menchaca has no recollection of this event and reports that she woke up in the ambulance not knowing what had happened. On arrival here she reports that she is feeling \"fine\" and she denies any current complaints. She notes that she has a known seizure disorder and she typically takes Levetiracetam on a daily basis but she has been out of that medication for at least the past few days. _____________________________________________________________________    Past Medical History:   Diagnosis Date    Seizure disorder [facility])        Past Surgical History:   Procedure Laterality Date     SECTION         Social History     Tobacco Use    Smoking status: Every Day     Packs/day: 0.50     Years: 10.00     Pack years: 5.00     Types: Cigarettes    Smokeless tobacco: Never   Substance Use Topics    Alcohol use: Not Currently    Drug use: Never       History reviewed. No pertinent family history. _____________________________________________________________________    Review of Systems   Constitutional:  Negative for chills, fatigue and fever. Respiratory:  Negative for shortness of breath. Cardiovascular:  Negative for chest pain. Gastrointestinal:  Negative for abdominal pain, nausea and vomiting.    Genitourinary:  Negative for decreased urine volume. Musculoskeletal:  Negative for gait problem. Skin:  Negative for rash. Neurological:  Positive for seizures. Negative for weakness, numbness and headaches. Psychiatric/Behavioral:  Negative for confusion. Physical Exam  Constitutional:       General: She is awake. She is not in acute distress. Appearance: She is not ill-appearing, toxic-appearing or diaphoretic. HENT:      Head: Normocephalic and atraumatic. Eyes:      Conjunctiva/sclera: Conjunctivae normal.   Neck:      Vascular: No JVD. Cardiovascular:      Rate and Rhythm: Normal rate and regular rhythm. Pulses:           Radial pulses are 2+ on the right side and 2+ on the left side. Heart sounds: Normal heart sounds. No murmur heard. Pulmonary:      Effort: Pulmonary effort is normal. No accessory muscle usage. Breath sounds: Normal breath sounds. Abdominal:      General: Bowel sounds are normal. There is no distension. Palpations: Abdomen is soft. Tenderness: There is no abdominal tenderness. There is no guarding or rebound. Skin:     General: Skin is warm and dry. Coloration: Skin is not cyanotic, mottled or pale. Neurological:      Mental Status: She is alert and oriented to person, place, and time. Comments:   Pupils equal, round, reactive to light and accommodation  Extraocular movements intact  Sensation to light touch intact and equal in V1, V2, and V3 distributions bilaterally. Face is symmetric with normal eye closure and smile. Hearing is normal to rubbing fingers  Palate elevates symmetrically. Phonation is normal.  Head turning and shoulder shrug are intact  Tongue is midline with normal movements and no atrophy.   RUE - 5/5 strength, normal bulk and tone, no tremor  RLE - 5/5 strength, normal bulk and tone, no tremor  LUE - 5/5 strength, normal bulk and tone, no tremor  LLE - 5/5 strength, normal bulk and tone, no tremor  Sensation to light touch intact and equal all extremities  Finger-to-nose movements intact without ataxia  Alert and oriented to self, place, month, situation  Recent and remote memory intact   Psychiatric:         Speech: Speech normal.         Behavior: Behavior normal.     _____________________________________________________________________    RESULTS:    Results for orders placed or performed during the hospital encounter of 23/57/76   Basic Metabolic Panel   Result Value Ref Range    Sodium 134 (L) 136 - 145 mmol/L    Potassium 4.8 3.5 - 5.1 mmol/L    Chloride 101 99 - 110 mmol/L    CO2 24 21 - 32 mmol/L    Anion Gap 9 3 - 16    Glucose 83 70 - 99 mg/dL    BUN 11 7 - 20 mg/dL    Creatinine 0.6 0.6 - 1.1 mg/dL    Est, Glom Filt Rate >60 >60    Calcium 9.6 8.3 - 10.6 mg/dL   Magnesium   Result Value Ref Range    Magnesium 1.90 1.80 - 2.40 mg/dL   Phosphorus   Result Value Ref Range    Phosphorus 2.9 2.5 - 4.9 mg/dL   HCG, Quantitative, Pregnancy   Result Value Ref Range    hCG Quant <5.0 <5.0 mIU/mL   Levetiracetam Level   Result Value Ref Range    Levetiracetam Lvl <2.0 (L) 6.0 - 46.0 ug/mL    KEPPRA Dose Amt Unknown      _____________________________________________________________________    Is this patient to be included in the SEP-1 Core Measure? No (no infection)  _____________________________________________________________________    MEDICAL DECISION MAKING & PLANS:    I reviewed the patient's recent and/or pertinent records, current medications, nurses notes, allergies, and vital signs. Differential Diagnosis:  Breakthrough seizure  Known seizure disorder  Low suspicion for acute, primary CNS insult    Labs & Studies:  Labs    Treatments:  Medications   levETIRAcetam (KEPPRA) tablet 1,000 mg (1,000 mg Oral Given 12/26/22 1809)     Rx Levetiracetam    Disposition:  Ms. Harris Parent has remained well-appearing and asymptomatic throughout her time in the ED this evening.  Serum Levetiracetam level is below the lab's limit of detection but her other lab results are reassuring. Administered a dose of Levetiracetam here. Discussed Dx and expected clinical course with her at length. She is requesting to be DC home now. Will Rx Levetiracetam refill as well. Return precautions reviewed in detail and outpatient follow up advised. Clinical Impression(s)  1. Breakthrough seizure [facility])      Provider Signature: MD Shelia Patel MD  01/07/23 9389

## 2023-02-16 ENCOUNTER — TELEPHONE (OUTPATIENT)
Dept: FAMILY MEDICINE CLINIC | Age: 33
End: 2023-02-16

## 2023-02-16 NOTE — TELEPHONE ENCOUNTER
Patient transferred from [facility] Patient states she has had an increase in seizures. Patient states she had 4 last night. Patient is taking Keppra and has not missed any doses. Patient complains of foul odor to urine x few months, has low back pain, no fever, however increase in head aches. Patient states neurologist is on vacation and his office advised patient ot call PCP. Patient advised to go to ER for evaluation. Patient's brother stated he would take her to  in . [address].

## 2023-12-02 ENCOUNTER — HOSPITAL ENCOUNTER (INPATIENT)
Age: 33
LOS: 1 days | Discharge: HOME | DRG: 053 | End: 2023-12-03
Attending: EMERGENCY MEDICINE | Admitting: STUDENT IN AN ORGANIZED HEALTH CARE EDUCATION/TRAINING PROGRAM
Payer: COMMERCIAL

## 2023-12-02 ENCOUNTER — APPOINTMENT (OUTPATIENT)
Dept: GENERAL RADIOLOGY | Age: 33
DRG: 053 | End: 2023-12-02
Payer: COMMERCIAL

## 2023-12-02 DIAGNOSIS — R09.02 HYPOXIA: ICD-10-CM

## 2023-12-02 DIAGNOSIS — G40.919 BREAKTHROUGH SEIZURE (HCC): Primary | ICD-10-CM

## 2023-12-02 DIAGNOSIS — G40.909 EPILEPSY WITH ALTERED CONSCIOUSNESS WITHOUT INTRACTABLE EPILEPSY (HCC): ICD-10-CM

## 2023-12-02 DIAGNOSIS — N39.0 ACUTE LOWER UTI: ICD-10-CM

## 2023-12-02 LAB
ALBUMIN SERPL-MCNC: 4.1 G/DL (ref 3.4–5)
ALBUMIN/GLOB SERPL: 2.3 {RATIO} (ref 1.1–2.2)
ALP SERPL-CCNC: 92 U/L (ref 40–129)
ALT SERPL-CCNC: 18 U/L (ref 10–40)
ANION GAP SERPL CALCULATED.3IONS-SCNC: 5 MMOL/L (ref 3–16)
AST SERPL-CCNC: 21 U/L (ref 15–37)
BACTERIA URNS QL MICRO: ABNORMAL /HPF
BASOPHILS # BLD: 0 K/UL (ref 0–0.2)
BASOPHILS NFR BLD: 0.5 %
BILIRUB SERPL-MCNC: 0.3 MG/DL (ref 0–1)
BILIRUB UR QL STRIP.AUTO: NEGATIVE
BUN SERPL-MCNC: 14 MG/DL (ref 7–20)
CALCIUM SERPL-MCNC: 8.7 MG/DL (ref 8.3–10.6)
CHLORIDE SERPL-SCNC: 108 MMOL/L (ref 99–110)
CLARITY UR: ABNORMAL
CO2 SERPL-SCNC: 28 MMOL/L (ref 21–32)
COLOR UR: YELLOW
CREAT SERPL-MCNC: 0.8 MG/DL (ref 0.6–1.1)
DEPRECATED RDW RBC AUTO: 12.9 % (ref 12.4–15.4)
EOSINOPHIL # BLD: 0.2 K/UL (ref 0–0.6)
EOSINOPHIL NFR BLD: 2.6 %
EPI CELLS #/AREA URNS AUTO: 5 /HPF (ref 0–5)
FLUAV RNA RESP QL NAA+PROBE: NOT DETECTED
FLUBV RNA RESP QL NAA+PROBE: NOT DETECTED
GFR SERPLBLD CREATININE-BSD FMLA CKD-EPI: >60 ML/MIN/{1.73_M2}
GLUCOSE SERPL-MCNC: 94 MG/DL (ref 70–99)
GLUCOSE UR STRIP.AUTO-MCNC: NEGATIVE MG/DL
HCG UR QL: NEGATIVE
HCT VFR BLD AUTO: 37.5 % (ref 36–48)
HGB BLD-MCNC: 12.9 G/DL (ref 12–16)
HGB UR QL STRIP.AUTO: NEGATIVE
HYALINE CASTS #/AREA URNS AUTO: 1 /LPF (ref 0–8)
KETONES UR STRIP.AUTO-MCNC: NEGATIVE MG/DL
LEUKOCYTE ESTERASE UR QL STRIP.AUTO: ABNORMAL
LEVETIRACETAM SERPL-MCNC: 30.5 UG/ML (ref 6–46)
LYMPHOCYTES # BLD: 1.9 K/UL (ref 1–5.1)
LYMPHOCYTES NFR BLD: 26 %
MCH RBC QN AUTO: 33.3 PG (ref 26–34)
MCHC RBC AUTO-ENTMCNC: 34.4 G/DL (ref 31–36)
MCV RBC AUTO: 97 FL (ref 80–100)
MEDICATION DOSE-MCNC: NORMAL
MONOCYTES # BLD: 0.4 K/UL (ref 0–1.3)
MONOCYTES NFR BLD: 5.8 %
NEUTROPHILS # BLD: 4.8 K/UL (ref 1.7–7.7)
NEUTROPHILS NFR BLD: 65.1 %
NITRITE UR QL STRIP.AUTO: POSITIVE
PH UR STRIP.AUTO: 6 [PH] (ref 5–8)
PLATELET # BLD AUTO: 169 K/UL (ref 135–450)
PMV BLD AUTO: 8.4 FL (ref 5–10.5)
POTASSIUM SERPL-SCNC: 4.1 MMOL/L (ref 3.5–5.1)
PROT SERPL-MCNC: 5.9 G/DL (ref 6.4–8.2)
PROT UR STRIP.AUTO-MCNC: 30 MG/DL
RBC # BLD AUTO: 3.86 M/UL (ref 4–5.2)
RBC CLUMPS #/AREA URNS AUTO: 0 /HPF (ref 0–4)
SARS-COV-2 RNA RESP QL NAA+PROBE: NOT DETECTED
SODIUM SERPL-SCNC: 141 MMOL/L (ref 136–145)
SP GR UR STRIP.AUTO: 1.02 (ref 1–1.03)
UA COMPLETE W REFLEX CULTURE PNL UR: YES
UA DIPSTICK W REFLEX MICRO PNL UR: YES
URN SPEC COLLECT METH UR: ABNORMAL
UROBILINOGEN UR STRIP-ACNC: 1 E.U./DL
WBC # BLD AUTO: 7.4 K/UL (ref 4–11)
WBC #/AREA URNS AUTO: 34 /HPF (ref 0–5)

## 2023-12-02 PROCEDURE — 99285 EMERGENCY DEPT VISIT HI MDM: CPT

## 2023-12-02 PROCEDURE — 2580000003 HC RX 258: Performed by: EMERGENCY MEDICINE

## 2023-12-02 PROCEDURE — 99222 1ST HOSP IP/OBS MODERATE 55: CPT | Performed by: PSYCHIATRY & NEUROLOGY

## 2023-12-02 PROCEDURE — 6360000002 HC RX W HCPCS: Performed by: EMERGENCY MEDICINE

## 2023-12-02 PROCEDURE — 2580000003 HC RX 258: Performed by: STUDENT IN AN ORGANIZED HEALTH CARE EDUCATION/TRAINING PROGRAM

## 2023-12-02 PROCEDURE — 96375 TX/PRO/DX INJ NEW DRUG ADDON: CPT

## 2023-12-02 PROCEDURE — 71045 X-RAY EXAM CHEST 1 VIEW: CPT

## 2023-12-02 PROCEDURE — G0378 HOSPITAL OBSERVATION PER HR: HCPCS

## 2023-12-02 PROCEDURE — 6360000002 HC RX W HCPCS: Performed by: STUDENT IN AN ORGANIZED HEALTH CARE EDUCATION/TRAINING PROGRAM

## 2023-12-02 PROCEDURE — 80053 COMPREHEN METABOLIC PANEL: CPT

## 2023-12-02 PROCEDURE — 84703 CHORIONIC GONADOTROPIN ASSAY: CPT

## 2023-12-02 PROCEDURE — 81001 URINALYSIS AUTO W/SCOPE: CPT

## 2023-12-02 PROCEDURE — 85025 COMPLETE CBC W/AUTO DIFF WBC: CPT

## 2023-12-02 PROCEDURE — 87077 CULTURE AEROBIC IDENTIFY: CPT

## 2023-12-02 PROCEDURE — 36415 COLL VENOUS BLD VENIPUNCTURE: CPT

## 2023-12-02 PROCEDURE — 96376 TX/PRO/DX INJ SAME DRUG ADON: CPT

## 2023-12-02 PROCEDURE — 87186 SC STD MICRODIL/AGAR DIL: CPT

## 2023-12-02 PROCEDURE — 96365 THER/PROPH/DIAG IV INF INIT: CPT

## 2023-12-02 PROCEDURE — 80177 DRUG SCRN QUAN LEVETIRACETAM: CPT

## 2023-12-02 PROCEDURE — 2060000000 HC ICU INTERMEDIATE R&B

## 2023-12-02 PROCEDURE — 87636 SARSCOV2 & INF A&B AMP PRB: CPT

## 2023-12-02 PROCEDURE — C9254 INJECTION, LACOSAMIDE: HCPCS | Performed by: STUDENT IN AN ORGANIZED HEALTH CARE EDUCATION/TRAINING PROGRAM

## 2023-12-02 PROCEDURE — 87086 URINE CULTURE/COLONY COUNT: CPT

## 2023-12-02 RX ORDER — POLYETHYLENE GLYCOL 3350 17 G/17G
17 POWDER, FOR SOLUTION ORAL DAILY PRN
Status: DISCONTINUED | OUTPATIENT
Start: 2023-12-02 | End: 2023-12-03 | Stop reason: HOSPADM

## 2023-12-02 RX ORDER — LEVETIRACETAM 500 MG/5ML
1000 INJECTION, SOLUTION, CONCENTRATE INTRAVENOUS ONCE
Status: COMPLETED | OUTPATIENT
Start: 2023-12-02 | End: 2023-12-02

## 2023-12-02 RX ORDER — ENOXAPARIN SODIUM 100 MG/ML
40 INJECTION SUBCUTANEOUS DAILY
Status: DISCONTINUED | OUTPATIENT
Start: 2023-12-02 | End: 2023-12-03 | Stop reason: HOSPADM

## 2023-12-02 RX ORDER — SODIUM CHLORIDE 0.9 % (FLUSH) 0.9 %
5-40 SYRINGE (ML) INJECTION PRN
Status: DISCONTINUED | OUTPATIENT
Start: 2023-12-02 | End: 2023-12-03 | Stop reason: HOSPADM

## 2023-12-02 RX ORDER — ONDANSETRON 2 MG/ML
4 INJECTION INTRAMUSCULAR; INTRAVENOUS EVERY 6 HOURS PRN
Status: DISCONTINUED | OUTPATIENT
Start: 2023-12-02 | End: 2023-12-03 | Stop reason: HOSPADM

## 2023-12-02 RX ORDER — SODIUM CHLORIDE 9 MG/ML
INJECTION, SOLUTION INTRAVENOUS PRN
Status: DISCONTINUED | OUTPATIENT
Start: 2023-12-02 | End: 2023-12-03 | Stop reason: HOSPADM

## 2023-12-02 RX ORDER — ACETAMINOPHEN 325 MG/1
650 TABLET ORAL EVERY 6 HOURS PRN
Status: DISCONTINUED | OUTPATIENT
Start: 2023-12-02 | End: 2023-12-03 | Stop reason: HOSPADM

## 2023-12-02 RX ORDER — SODIUM CHLORIDE 0.9 % (FLUSH) 0.9 %
5-40 SYRINGE (ML) INJECTION EVERY 12 HOURS SCHEDULED
Status: DISCONTINUED | OUTPATIENT
Start: 2023-12-02 | End: 2023-12-03 | Stop reason: HOSPADM

## 2023-12-02 RX ORDER — SODIUM CHLORIDE, SODIUM LACTATE, POTASSIUM CHLORIDE, CALCIUM CHLORIDE 600; 310; 30; 20 MG/100ML; MG/100ML; MG/100ML; MG/100ML
INJECTION, SOLUTION INTRAVENOUS CONTINUOUS
Status: DISCONTINUED | OUTPATIENT
Start: 2023-12-02 | End: 2023-12-03 | Stop reason: HOSPADM

## 2023-12-02 RX ORDER — LACOSAMIDE 10 MG/ML
50 INJECTION, SOLUTION INTRAVENOUS 2 TIMES DAILY
Status: DISCONTINUED | OUTPATIENT
Start: 2023-12-02 | End: 2023-12-03 | Stop reason: HOSPADM

## 2023-12-02 RX ORDER — POTASSIUM CHLORIDE 20 MEQ/1
40 TABLET, EXTENDED RELEASE ORAL PRN
Status: DISCONTINUED | OUTPATIENT
Start: 2023-12-02 | End: 2023-12-03 | Stop reason: HOSPADM

## 2023-12-02 RX ORDER — ACETAMINOPHEN 650 MG/1
650 SUPPOSITORY RECTAL EVERY 6 HOURS PRN
Status: DISCONTINUED | OUTPATIENT
Start: 2023-12-02 | End: 2023-12-03 | Stop reason: HOSPADM

## 2023-12-02 RX ORDER — ONDANSETRON 4 MG/1
4 TABLET, ORALLY DISINTEGRATING ORAL EVERY 8 HOURS PRN
Status: DISCONTINUED | OUTPATIENT
Start: 2023-12-02 | End: 2023-12-03 | Stop reason: HOSPADM

## 2023-12-02 RX ORDER — POTASSIUM CHLORIDE 7.45 MG/ML
10 INJECTION INTRAVENOUS PRN
Status: DISCONTINUED | OUTPATIENT
Start: 2023-12-02 | End: 2023-12-03 | Stop reason: HOSPADM

## 2023-12-02 RX ORDER — MAGNESIUM SULFATE IN WATER 40 MG/ML
2000 INJECTION, SOLUTION INTRAVENOUS PRN
Status: DISCONTINUED | OUTPATIENT
Start: 2023-12-02 | End: 2023-12-03 | Stop reason: HOSPADM

## 2023-12-02 RX ADMIN — SODIUM CHLORIDE, PRESERVATIVE FREE 10 ML: 5 INJECTION INTRAVENOUS at 07:56

## 2023-12-02 RX ADMIN — SODIUM CHLORIDE, POTASSIUM CHLORIDE, SODIUM LACTATE AND CALCIUM CHLORIDE: 600; 310; 30; 20 INJECTION, SOLUTION INTRAVENOUS at 21:21

## 2023-12-02 RX ADMIN — CEFTRIAXONE SODIUM 1000 MG: 1 INJECTION, POWDER, FOR SOLUTION INTRAMUSCULAR; INTRAVENOUS at 14:35

## 2023-12-02 RX ADMIN — CEFTRIAXONE SODIUM 1000 MG: 1 INJECTION, POWDER, FOR SOLUTION INTRAMUSCULAR; INTRAVENOUS at 05:52

## 2023-12-02 RX ADMIN — SODIUM CHLORIDE, POTASSIUM CHLORIDE, SODIUM LACTATE AND CALCIUM CHLORIDE: 600; 310; 30; 20 INJECTION, SOLUTION INTRAVENOUS at 07:57

## 2023-12-02 RX ADMIN — LEVETIRACETAM 1000 MG: 100 INJECTION, SOLUTION INTRAVENOUS at 03:28

## 2023-12-02 RX ADMIN — LACOSAMIDE 50 MG: 10 INJECTION INTRAVENOUS at 19:41

## 2023-12-02 ASSESSMENT — PAIN SCALES - GENERAL: PAINLEVEL_OUTOF10: 8

## 2023-12-02 ASSESSMENT — LIFESTYLE VARIABLES
HOW OFTEN DO YOU HAVE A DRINK CONTAINING ALCOHOL: MONTHLY OR LESS
HOW MANY STANDARD DRINKS CONTAINING ALCOHOL DO YOU HAVE ON A TYPICAL DAY: 1 OR 2

## 2023-12-02 ASSESSMENT — PAIN - FUNCTIONAL ASSESSMENT: PAIN_FUNCTIONAL_ASSESSMENT: 0-10

## 2023-12-02 NOTE — ED PROVIDER NOTES
EMERGENCY MEDICINE ATTENDING NOTE  Cortland Kehr. Chad Harrell., DO, FACEP, [address]  [place] COMPLAINT  Chief Complaint   Patient presents with    Seizures     Pt came in from home via [facility]. Pt has been having continuous seizures today and her last one lasted 30 seconds to a minute. Pt has a hst. Seizures. HISTORY OF PRESENT ILLNESS  Trista Perez is a 35 y.o. female who presents to the ED for evaluation of seizures. Patient has known history of seizure disorder. She states over the last few days she has had several seizures. She had multiple day which eventually prompted us to be called. Patient states he has been under tremendous emotional stress lately and thinks that is what is triggering it. States she usually does have occasional breakthrough seizures but not as frequently as is. She is denying any symptoms such as fever or urinary issues. No cough or shortness of breath. No nausea or vomiting. States he feels a little groggy right now but no other issues. She does take Keppra and has not missed any doses. Nursing/triage notes reviewed. No other complaints, modifying factors or associated symptoms. REVIEW OF SYSTEMS:  All systems are reviewed and are negative unless noted in the HPI. PAST MEDICAL HISTORY  Past Medical History:   Diagnosis Date    Seizure disorder [facility])        SURGICAL HISTORY  Past Surgical History:   Procedure Laterality Date     SECTION         FAMILY HISTORY  History reviewed. No pertinent family history.     SOCIAL HISTORY  Social History     Socioeconomic History    Marital status: Single     Spouse name: Not on file    Number of children: Not on file    Years of education: Not on file    Highest education level: Not on file   Occupational History    Not on file   Tobacco Use    Smoking status: Every Day     Packs/day: 0.50     Years: 10.00     Additional pack years: 0.00     Total pack years: 5.00     Types: Cigarettes    Smokeless

## 2023-12-02 NOTE — PLAN OF CARE
Problem: Safety - Adult  Goal: Free from fall injury  Outcome: Progressing  Note: Patient remains absent from falls at this time. Remains in bed with eyes closed, call light and belongings in reach. Non-slip footwear on and 2/4 siderails raised with seizure pads in place. Bed remains in lowest/locked position at all times with alarm activated. Fall precautions in place. Camera in room. Patient encouraged to use call light to request assistance, v/u.  Will continue to monitor. Problem: Neurosensory - Adult  Goal: Achieves stable or improved neurological status  Outcome: Progressing  Note: Patient VSS throughout shift. Remains alert and oriented, with no seizure episodes noted - seizure precautions in place per protocol. Will continue to monitor.

## 2023-12-02 NOTE — ED NOTES
Pt o2 stats dropped to high 80s. Placed on 2L of oxygen. Provider notified.      Kevin Noonan RN  12/02/23 4770

## 2023-12-02 NOTE — ED NOTES
Pt blood pressure has started to drop. This RN notified Dr. Nolan Khan d/t to Dr. Merritt Amador leaving. Dr. Nolan Khan said it was a hospitalist problem d/t patient being admitted. Hospitalist states that he has not taken over care for that patient. This RN attempted to voice her concerns.       Kae Aleman RN  12/02/23 3650

## 2023-12-02 NOTE — PROGRESS NOTES
Shift assessment completed. Pt A&O, VSS. Denies any needs at this time. Bed locked and in lowest position. Call light within reach. Will continue to monitor. Report called to [facility] on PCU.

## 2023-12-03 VITALS
OXYGEN SATURATION: 98 % | WEIGHT: 116.84 LBS | HEART RATE: 77 BPM | TEMPERATURE: 98.5 F | BODY MASS INDEX: 18.34 KG/M2 | RESPIRATION RATE: 17 BRPM | DIASTOLIC BLOOD PRESSURE: 70 MMHG | HEIGHT: 67 IN | SYSTOLIC BLOOD PRESSURE: 116 MMHG

## 2023-12-03 LAB
GLUCOSE BLD-MCNC: 91 MG/DL (ref 70–99)
PERFORMED ON: NORMAL

## 2023-12-03 PROCEDURE — 96376 TX/PRO/DX INJ SAME DRUG ADON: CPT

## 2023-12-03 PROCEDURE — 6360000002 HC RX W HCPCS: Performed by: STUDENT IN AN ORGANIZED HEALTH CARE EDUCATION/TRAINING PROGRAM

## 2023-12-03 PROCEDURE — C9254 INJECTION, LACOSAMIDE: HCPCS | Performed by: STUDENT IN AN ORGANIZED HEALTH CARE EDUCATION/TRAINING PROGRAM

## 2023-12-03 PROCEDURE — 2580000003 HC RX 258: Performed by: STUDENT IN AN ORGANIZED HEALTH CARE EDUCATION/TRAINING PROGRAM

## 2023-12-03 PROCEDURE — G0378 HOSPITAL OBSERVATION PER HR: HCPCS

## 2023-12-03 RX ORDER — LACOSAMIDE 50 MG/1
50 TABLET ORAL 2 TIMES DAILY
Qty: 60 TABLET | Refills: 1 | Status: SHIPPED | OUTPATIENT
Start: 2023-12-03 | End: 2024-02-01

## 2023-12-03 RX ORDER — NITROFURANTOIN 25; 75 MG/1; MG/1
100 CAPSULE ORAL 2 TIMES DAILY
Qty: 14 CAPSULE | Refills: 0 | Status: SHIPPED | OUTPATIENT
Start: 2023-12-03 | End: 2023-12-13

## 2023-12-03 RX ADMIN — SODIUM CHLORIDE, PRESERVATIVE FREE 10 ML: 5 INJECTION INTRAVENOUS at 09:28

## 2023-12-03 RX ADMIN — LACOSAMIDE 50 MG: 10 INJECTION INTRAVENOUS at 09:29

## 2023-12-03 NOTE — PLAN OF CARE
Problem: Discharge Planning  Goal: Discharge to home or other facility with appropriate resources  Outcome: Progressing     Problem: Respiratory - Adult  Goal: Achieves optimal ventilation and oxygenation  Outcome: Progressing     Problem: Cardiovascular - Adult  Goal: Maintains optimal cardiac output and hemodynamic stability  Outcome: Progressing  Goal: Absence of cardiac dysrhythmias or at baseline  Outcome: Progressing     Problem: Musculoskeletal - Adult  Goal: Return mobility to safest level of function  Outcome: Progressing     Problem: Neurosensory - Adult  Goal: Achieves stable or improved neurological status  Outcome: Progressing    Goal: Absence of seizures  Outcome: Progressing     Problem: Genitourinary - Adult  Goal: Absence of urinary retention  Outcome: Progressing     Problem: Safety - Adult  Goal: Free from fall injury  Outcome: Progressing

## 2023-12-03 NOTE — CARE COORDINATION
Discharge Planning Note:    Chart reviewed and it appears that patient has minimal needs for discharge at this time. Risk Score 6 %     Primary Care Physician is Kisha Thomas MD      [facility]     Please notify case management if any discharge needs are identified. Case management will continue to follow progress and update discharge plan as needed.      Electronically signed by Babar Carlos RN on 12/3/2023 at 11:20 AM

## 2023-12-03 NOTE — DISCHARGE SUMMARY
failure,  CVA    Consults: neurology    Imaging Studies:  XR CHEST PORTABLE    Result Date: 12/2/2023  EXAMINATION: ONE XRAY VIEW OF THE CHEST 12/2/2023 5:53 am COMPARISON: 09/17/2021 HISTORY: ORDERING SYSTEM PROVIDED HISTORY: hypoxia TECHNOLOGIST PROVIDED HISTORY: Reason for exam:->hypoxia Reason for Exam: hypoxia/seizures FINDINGS: Cardiac and mediastinal silhouettes appear stable. Pulmonary vascularity is normal.  No consolidation or effusion. No pneumothorax. No acute osseous abnormality is identified. Clear chest without acute cardiopulmonary process. Other Significant Diagnostic Studies: As described above     Treatments: As described above    Disposition: home    Discharge Medications:       Medication List        START taking these medications      lacosamide 50 MG Tabs tablet  Commonly known as: VIMPAT  Take 1 tablet by mouth 2 times daily for 60 days. Max Daily Amount: 100 mg  Notes to patient: Use: to treat seizures  Side effects: dizziness, blurred vision, headache, nausea     nitrofurantoin (macrocrystal-monohydrate) 100 MG capsule  Commonly known as: MACROBID  Take 1 capsule by mouth 2 times daily for 10 days  Notes to patient: Nitrofurantoin (Macrobid*, Macrodantin*)  Use: treats urinary tract infections  Side effects: headache, upset stomach, vomiting, discoloration of urine.             CONTINUE taking these medications      levETIRAcetam 750 MG tablet  Commonly known as: KEPPRA  Take 2 tablets by mouth 2 times daily  Notes to patient: Levetiracetam (Keppra)  Use: treatment for seizures  Side effects: anxiety, irritability, crying, dry mouth, decreased appetite, diarrhea, general malaise               Where to Get Your Medications        These medications were sent to [facility] [id number] [facility], [address] [facility] [phone]  [address] [place] [address]      Phone: [phone]   lacosamide 50 MG Tabs tablet  nitrofurantoin

## 2023-12-04 LAB
BACTERIA UR CULT: ABNORMAL
ORGANISM: ABNORMAL

## 2023-12-06 ENCOUNTER — TELEPHONE (OUTPATIENT)
Dept: FAMILY MEDICINE CLINIC | Age: 33
End: 2023-12-06

## 2023-12-06 NOTE — TELEPHONE ENCOUNTER
Care Transitions Initial Follow Up Call    Outreach made within 2 business days of discharge: Yes    Patient: Germania George Patient : 1990   MRN: [id number]  Reason for Admission: There are no discharge diagnoses documented for the most recent discharge. Discharge Date: 12/3/23       Spoke with: Patient    Discharge department/facility: [facility]    TCM Interactive Patient Contact:  Was patient able to fill all prescriptions: Yes  Was patient instructed to bring all medications to the follow-up visit: Yes  Is patient taking all medications as directed in the discharge summary? Yes  Does patient understand their discharge instructions: Yes  Does patient have questions or concerns that need addressed prior to 7-14 day follow up office visit: no    Scheduled appointment with PCP within 7-14 days    Follow Up  No future appointments.     Laurie Worthington LPN

## 2023-12-10 NOTE — PROGRESS NOTES
strep pneumonia, and tdap. Recommended the importance of seeing a primary care physician (PCP) on a routine basis. She vocalized desire to remain seeing me as her PCP. Orders:  -     [place] TOBACCO USE CESSATION INTERMEDIATE 3-10 MINUTES      Clinical status:  [] Resolved/back to baseline  [x] Improved, but still has mild residual issues  [] Still has significant clinical issues    Medical Decision Making: moderate complexity    General information on medications:  -When it comes to medications, whether with starting or adding a new medication or increasing the dose of a current medication, the benefits and risks have to always be considered and weighed over, especially if one is taking other medications as well. -There are no medications that have no side effects and that there is always a risk involved with taking a medication.    -If a side effect were to occur with starting a new medication or with increasing the dose of a current medication that either the medication can be totally discontinued altogether or simply decrease the dose of it and if this would be the case a follow-up appointment would be deemed necessary.    -The drug allergy list will then be updated with the corresponding side effect(s) if it's deemed to be a true 'drug allergy'. -The most common adverse effects of medication(s) were addressed at today's visit.    -Lastly, the coverage status of a medication may vary from insurance to insurance and the only way to verify if the medication is covered is to send an actual prescription in.    -The drug formulary of each insurance changes without any warning or notification to the healthcare provider let alone the pharmacy.  -The cost of medications vary from insurance to insurance and the cost is always subject to change just like the drug formulary.     Follow-up: Return in about 3 months (around 3/11/2024) for annual physical..     Patient was informed that if his or her symptoms worsen to follow

## 2023-12-11 ENCOUNTER — OFFICE VISIT (OUTPATIENT)
Dept: FAMILY MEDICINE CLINIC | Age: 33
End: 2023-12-11
Payer: COMMERCIAL

## 2023-12-11 VITALS
HEART RATE: 75 BPM | OXYGEN SATURATION: 100 % | TEMPERATURE: 97.3 F | SYSTOLIC BLOOD PRESSURE: 100 MMHG | BODY MASS INDEX: 20.11 KG/M2 | WEIGHT: 128.4 LBS | DIASTOLIC BLOOD PRESSURE: 64 MMHG

## 2023-12-11 DIAGNOSIS — F17.200 CURRENT SMOKER: ICD-10-CM

## 2023-12-11 DIAGNOSIS — Z71.9 HEALTH EDUCATION/COUNSELING: ICD-10-CM

## 2023-12-11 DIAGNOSIS — Z09 HOSPITAL DISCHARGE FOLLOW-UP: Primary | ICD-10-CM

## 2023-12-11 DIAGNOSIS — R56.9 SEIZURE (HCC): ICD-10-CM

## 2023-12-11 PROCEDURE — 1111F DSCHRG MED/CURRENT MED MERGE: CPT | Performed by: FAMILY MEDICINE

## 2023-12-11 PROCEDURE — 99214 OFFICE O/P EST MOD 30 MIN: CPT | Performed by: FAMILY MEDICINE

## 2023-12-11 PROCEDURE — 99406 BEHAV CHNG SMOKING 3-10 MIN: CPT | Performed by: FAMILY MEDICINE

## 2023-12-11 RX ORDER — NICOTINE 21 MG/24HR
1 PATCH, TRANSDERMAL 24 HOURS TRANSDERMAL DAILY
Qty: 42 PATCH | Refills: 5 | Status: SHIPPED | OUTPATIENT
Start: 2023-12-11 | End: 2024-08-19

## 2023-12-11 ASSESSMENT — PATIENT HEALTH QUESTIONNAIRE - PHQ9
SUM OF ALL RESPONSES TO PHQ QUESTIONS 1-9: 0
SUM OF ALL RESPONSES TO PHQ QUESTIONS 1-9: 0
SUM OF ALL RESPONSES TO PHQ9 QUESTIONS 1 & 2: 0
SUM OF ALL RESPONSES TO PHQ QUESTIONS 1-9: 0
SUM OF ALL RESPONSES TO PHQ QUESTIONS 1-9: 0
1. LITTLE INTEREST OR PLEASURE IN DOING THINGS: 0
2. FEELING DOWN, DEPRESSED OR HOPELESS: 0

## 2023-12-12 ASSESSMENT — ENCOUNTER SYMPTOMS
CONSTIPATION: 0
DIARRHEA: 0
COUGH: 0
BACK PAIN: 0
ABDOMINAL PAIN: 0
SHORTNESS OF BREATH: 0
SINUS PRESSURE: 0
CHEST TIGHTNESS: 0
BLOOD IN STOOL: 0
NAUSEA: 0
RHINORRHEA: 0
WHEEZING: 0
ABDOMINAL DISTENTION: 0
VOMITING: 0
TROUBLE SWALLOWING: 0

## 2024-01-28 ENCOUNTER — HOSPITAL ENCOUNTER (INPATIENT)
Age: 34
LOS: 1 days | Discharge: LEFT BEFORE DISCHARGE | DRG: 053 | End: 2024-01-28
Attending: EMERGENCY MEDICINE | Admitting: INTERNAL MEDICINE
Payer: COMMERCIAL

## 2024-01-28 ENCOUNTER — APPOINTMENT (OUTPATIENT)
Dept: CT IMAGING | Age: 34
DRG: 053 | End: 2024-01-28
Payer: COMMERCIAL

## 2024-01-28 ENCOUNTER — APPOINTMENT (OUTPATIENT)
Dept: GENERAL RADIOLOGY | Age: 34
DRG: 053 | End: 2024-01-28
Payer: COMMERCIAL

## 2024-01-28 VITALS
DIASTOLIC BLOOD PRESSURE: 63 MMHG | BODY MASS INDEX: 20.4 KG/M2 | SYSTOLIC BLOOD PRESSURE: 96 MMHG | OXYGEN SATURATION: 97 % | WEIGHT: 130 LBS | TEMPERATURE: 98.3 F | HEIGHT: 67 IN | HEART RATE: 75 BPM | RESPIRATION RATE: 14 BRPM

## 2024-01-28 DIAGNOSIS — G40.919 BREAKTHROUGH SEIZURE (HCC): Primary | ICD-10-CM

## 2024-01-28 DIAGNOSIS — R09.02 HYPOXIA: ICD-10-CM

## 2024-01-28 DIAGNOSIS — G40.909 EPILEPSY WITH ALTERED CONSCIOUSNESS WITHOUT INTRACTABLE EPILEPSY (HCC): ICD-10-CM

## 2024-01-28 PROBLEM — F17.200 TOBACCO DEPENDENCE SYNDROME: Status: ACTIVE | Noted: 2024-01-28

## 2024-01-28 LAB
AMPHETAMINES UR QL SCN>1000 NG/ML: NORMAL
ANION GAP SERPL CALCULATED.3IONS-SCNC: 7 MMOL/L (ref 3–16)
BACTERIA URNS QL MICRO: NORMAL /HPF
BARBITURATES UR QL SCN>200 NG/ML: NORMAL
BASOPHILS # BLD: 0 K/UL (ref 0–0.2)
BASOPHILS NFR BLD: 0.6 %
BENZODIAZ UR QL SCN>200 NG/ML: NORMAL
BILIRUB UR QL STRIP.AUTO: NEGATIVE
BUN SERPL-MCNC: 9 MG/DL (ref 7–20)
CALCIUM SERPL-MCNC: 8.2 MG/DL (ref 8.3–10.6)
CANNABINOIDS UR QL SCN>50 NG/ML: NORMAL
CHLORIDE SERPL-SCNC: 110 MMOL/L (ref 99–110)
CLARITY UR: CLEAR
CO2 SERPL-SCNC: 27 MMOL/L (ref 21–32)
COCAINE UR QL SCN: NORMAL
COLOR UR: YELLOW
CREAT SERPL-MCNC: 0.7 MG/DL (ref 0.6–1.1)
DEPRECATED RDW RBC AUTO: 13.1 % (ref 12.4–15.4)
DRUG SCREEN COMMENT UR-IMP: NORMAL
EKG ATRIAL RATE: 82 BPM
EKG DIAGNOSIS: NORMAL
EKG P AXIS: 77 DEGREES
EKG P-R INTERVAL: 146 MS
EKG Q-T INTERVAL: 384 MS
EKG QRS DURATION: 78 MS
EKG QTC CALCULATION (BAZETT): 448 MS
EKG R AXIS: 94 DEGREES
EKG T AXIS: 71 DEGREES
EKG VENTRICULAR RATE: 82 BPM
EOSINOPHIL # BLD: 0.2 K/UL (ref 0–0.6)
EOSINOPHIL NFR BLD: 3.4 %
EPI CELLS #/AREA URNS AUTO: 1 /HPF (ref 0–5)
ETHANOLAMINE SERPL-MCNC: NORMAL MG/DL (ref 0–0.08)
FENTANYL SCREEN, URINE: NORMAL
GFR SERPLBLD CREATININE-BSD FMLA CKD-EPI: >60 ML/MIN/{1.73_M2}
GLUCOSE SERPL-MCNC: 109 MG/DL (ref 70–99)
GLUCOSE UR STRIP.AUTO-MCNC: NEGATIVE MG/DL
HCG SERPL QL: NEGATIVE
HCT VFR BLD AUTO: 38.1 % (ref 36–48)
HGB BLD-MCNC: 12.9 G/DL (ref 12–16)
HGB UR QL STRIP.AUTO: NEGATIVE
HYALINE CASTS #/AREA URNS AUTO: 0 /LPF (ref 0–8)
KETONES UR STRIP.AUTO-MCNC: NEGATIVE MG/DL
LEUKOCYTE ESTERASE UR QL STRIP.AUTO: ABNORMAL
LEVETIRACETAM SERPL-MCNC: 5.1 UG/ML (ref 6–46)
LYMPHOCYTES # BLD: 1.5 K/UL (ref 1–5.1)
LYMPHOCYTES NFR BLD: 27.2 %
MCH RBC QN AUTO: 32.5 PG (ref 26–34)
MCHC RBC AUTO-ENTMCNC: 33.9 G/DL (ref 31–36)
MCV RBC AUTO: 95.8 FL (ref 80–100)
MEDICATION DOSE-MCNC: ABNORMAL
METHADONE UR QL SCN>300 NG/ML: NORMAL
MONOCYTES # BLD: 0.3 K/UL (ref 0–1.3)
MONOCYTES NFR BLD: 6.5 %
NEUTROPHILS # BLD: 3.3 K/UL (ref 1.7–7.7)
NEUTROPHILS NFR BLD: 62.3 %
NITRITE UR QL STRIP.AUTO: NEGATIVE
OPIATES UR QL SCN>300 NG/ML: NORMAL
OXYCODONE UR QL SCN: NORMAL
PCP UR QL SCN>25 NG/ML: NORMAL
PH UR STRIP.AUTO: 7 [PH] (ref 5–8)
PH UR STRIP: 7 [PH]
PLATELET # BLD AUTO: 191 K/UL (ref 135–450)
PMV BLD AUTO: 8.8 FL (ref 5–10.5)
POTASSIUM SERPL-SCNC: 4 MMOL/L (ref 3.5–5.1)
PROT UR STRIP.AUTO-MCNC: NEGATIVE MG/DL
RBC # BLD AUTO: 3.98 M/UL (ref 4–5.2)
RBC CLUMPS #/AREA URNS AUTO: 1 /HPF (ref 0–4)
SODIUM SERPL-SCNC: 144 MMOL/L (ref 136–145)
SP GR UR STRIP.AUTO: 1.02 (ref 1–1.03)
UA COMPLETE W REFLEX CULTURE PNL UR: ABNORMAL
UA DIPSTICK W REFLEX MICRO PNL UR: YES
URN SPEC COLLECT METH UR: ABNORMAL
UROBILINOGEN UR STRIP-ACNC: 1 E.U./DL
WBC # BLD AUTO: 5.3 K/UL (ref 4–11)
WBC #/AREA URNS AUTO: 1 /HPF (ref 0–5)

## 2024-01-28 PROCEDURE — 85025 COMPLETE CBC W/AUTO DIFF WBC: CPT

## 2024-01-28 PROCEDURE — 81001 URINALYSIS AUTO W/SCOPE: CPT

## 2024-01-28 PROCEDURE — 71045 X-RAY EXAM CHEST 1 VIEW: CPT

## 2024-01-28 PROCEDURE — 80048 BASIC METABOLIC PNL TOTAL CA: CPT

## 2024-01-28 PROCEDURE — 93005 ELECTROCARDIOGRAM TRACING: CPT | Performed by: EMERGENCY MEDICINE

## 2024-01-28 PROCEDURE — 80177 DRUG SCRN QUAN LEVETIRACETAM: CPT

## 2024-01-28 PROCEDURE — 96374 THER/PROPH/DIAG INJ IV PUSH: CPT

## 2024-01-28 PROCEDURE — 93010 ELECTROCARDIOGRAM REPORT: CPT | Performed by: INTERNAL MEDICINE

## 2024-01-28 PROCEDURE — 6360000002 HC RX W HCPCS

## 2024-01-28 PROCEDURE — 2580000003 HC RX 258: Performed by: INTERNAL MEDICINE

## 2024-01-28 PROCEDURE — 99285 EMERGENCY DEPT VISIT HI MDM: CPT

## 2024-01-28 PROCEDURE — 82077 ASSAY SPEC XCP UR&BREATH IA: CPT

## 2024-01-28 PROCEDURE — 70450 CT HEAD/BRAIN W/O DYE: CPT

## 2024-01-28 PROCEDURE — 80307 DRUG TEST PRSMV CHEM ANLYZR: CPT

## 2024-01-28 PROCEDURE — 84703 CHORIONIC GONADOTROPIN ASSAY: CPT

## 2024-01-28 PROCEDURE — 6360000002 HC RX W HCPCS: Performed by: EMERGENCY MEDICINE

## 2024-01-28 PROCEDURE — 2580000003 HC RX 258: Performed by: EMERGENCY MEDICINE

## 2024-01-28 PROCEDURE — 1200000000 HC SEMI PRIVATE

## 2024-01-28 RX ORDER — SODIUM CHLORIDE 0.9 % (FLUSH) 0.9 %
5-40 SYRINGE (ML) INJECTION EVERY 12 HOURS SCHEDULED
Status: DISCONTINUED | OUTPATIENT
Start: 2024-01-28 | End: 2024-01-28 | Stop reason: HOSPADM

## 2024-01-28 RX ORDER — MAGNESIUM SULFATE IN WATER 40 MG/ML
2000 INJECTION, SOLUTION INTRAVENOUS PRN
Status: DISCONTINUED | OUTPATIENT
Start: 2024-01-28 | End: 2024-01-28 | Stop reason: HOSPADM

## 2024-01-28 RX ORDER — 0.9 % SODIUM CHLORIDE 0.9 %
500 INTRAVENOUS SOLUTION INTRAVENOUS ONCE
Status: COMPLETED | OUTPATIENT
Start: 2024-01-28 | End: 2024-01-28

## 2024-01-28 RX ORDER — NICOTINE 21 MG/24HR
1 PATCH, TRANSDERMAL 24 HOURS TRANSDERMAL DAILY
Status: DISCONTINUED | OUTPATIENT
Start: 2024-01-28 | End: 2024-01-28 | Stop reason: HOSPADM

## 2024-01-28 RX ORDER — POTASSIUM CHLORIDE 7.45 MG/ML
10 INJECTION INTRAVENOUS PRN
Status: DISCONTINUED | OUTPATIENT
Start: 2024-01-28 | End: 2024-01-28 | Stop reason: HOSPADM

## 2024-01-28 RX ORDER — ACETAMINOPHEN 325 MG/1
650 TABLET ORAL EVERY 6 HOURS PRN
Status: DISCONTINUED | OUTPATIENT
Start: 2024-01-28 | End: 2024-01-28 | Stop reason: HOSPADM

## 2024-01-28 RX ORDER — ONDANSETRON 2 MG/ML
4 INJECTION INTRAMUSCULAR; INTRAVENOUS EVERY 6 HOURS PRN
Status: DISCONTINUED | OUTPATIENT
Start: 2024-01-28 | End: 2024-01-28 | Stop reason: HOSPADM

## 2024-01-28 RX ORDER — LEVETIRACETAM 500 MG/1
1500 TABLET ORAL 2 TIMES DAILY
Status: DISCONTINUED | OUTPATIENT
Start: 2024-01-28 | End: 2024-01-28 | Stop reason: HOSPADM

## 2024-01-28 RX ORDER — ONDANSETRON 4 MG/1
4 TABLET, ORALLY DISINTEGRATING ORAL EVERY 8 HOURS PRN
Status: DISCONTINUED | OUTPATIENT
Start: 2024-01-28 | End: 2024-01-28 | Stop reason: HOSPADM

## 2024-01-28 RX ORDER — DIAZEPAM 5 MG/ML
10 INJECTION, SOLUTION INTRAMUSCULAR; INTRAVENOUS EVERY 4 HOURS PRN
Status: DISCONTINUED | OUTPATIENT
Start: 2024-01-28 | End: 2024-01-28 | Stop reason: HOSPADM

## 2024-01-28 RX ORDER — LACOSAMIDE 50 MG/1
50 TABLET ORAL 2 TIMES DAILY
Qty: 60 TABLET | Refills: 0 | Status: SHIPPED | OUTPATIENT
Start: 2024-01-28 | End: 2024-02-02 | Stop reason: DRUGHIGH

## 2024-01-28 RX ORDER — POTASSIUM CHLORIDE 20 MEQ/1
40 TABLET, EXTENDED RELEASE ORAL PRN
Status: DISCONTINUED | OUTPATIENT
Start: 2024-01-28 | End: 2024-01-28 | Stop reason: HOSPADM

## 2024-01-28 RX ORDER — 0.9 % SODIUM CHLORIDE 0.9 %
500 INTRAVENOUS SOLUTION INTRAVENOUS PRN
Status: DISCONTINUED | OUTPATIENT
Start: 2024-01-28 | End: 2024-01-28 | Stop reason: HOSPADM

## 2024-01-28 RX ORDER — SODIUM CHLORIDE 0.9 % (FLUSH) 0.9 %
5-40 SYRINGE (ML) INJECTION PRN
Status: DISCONTINUED | OUTPATIENT
Start: 2024-01-28 | End: 2024-01-28 | Stop reason: HOSPADM

## 2024-01-28 RX ORDER — LACOSAMIDE 50 MG/1
50 TABLET ORAL 2 TIMES DAILY
Status: DISCONTINUED | OUTPATIENT
Start: 2024-01-28 | End: 2024-01-28 | Stop reason: HOSPADM

## 2024-01-28 RX ORDER — LEVETIRACETAM 500 MG/5ML
1000 INJECTION, SOLUTION, CONCENTRATE INTRAVENOUS ONCE
Status: COMPLETED | OUTPATIENT
Start: 2024-01-28 | End: 2024-01-28

## 2024-01-28 RX ORDER — SODIUM CHLORIDE 9 MG/ML
INJECTION, SOLUTION INTRAVENOUS PRN
Status: DISCONTINUED | OUTPATIENT
Start: 2024-01-28 | End: 2024-01-28 | Stop reason: HOSPADM

## 2024-01-28 RX ORDER — ENOXAPARIN SODIUM 100 MG/ML
40 INJECTION SUBCUTANEOUS DAILY
Status: DISCONTINUED | OUTPATIENT
Start: 2024-01-29 | End: 2024-01-28 | Stop reason: HOSPADM

## 2024-01-28 RX ORDER — LEVETIRACETAM 750 MG/1
1500 TABLET ORAL 2 TIMES DAILY
Qty: 120 TABLET | Refills: 0 | Status: SHIPPED | OUTPATIENT
Start: 2024-01-28 | End: 2024-02-02 | Stop reason: DRUGHIGH

## 2024-01-28 RX ORDER — HYDRALAZINE HYDROCHLORIDE 20 MG/ML
10 INJECTION INTRAMUSCULAR; INTRAVENOUS EVERY 6 HOURS PRN
Status: DISCONTINUED | OUTPATIENT
Start: 2024-01-28 | End: 2024-01-28 | Stop reason: HOSPADM

## 2024-01-28 RX ORDER — 0.9 % SODIUM CHLORIDE 0.9 %
1000 INTRAVENOUS SOLUTION INTRAVENOUS ONCE
Status: COMPLETED | OUTPATIENT
Start: 2024-01-28 | End: 2024-01-28

## 2024-01-28 RX ORDER — ACETAMINOPHEN 650 MG/1
650 SUPPOSITORY RECTAL EVERY 6 HOURS PRN
Status: DISCONTINUED | OUTPATIENT
Start: 2024-01-28 | End: 2024-01-28 | Stop reason: HOSPADM

## 2024-01-28 RX ORDER — DIAZEPAM 5 MG/ML
INJECTION, SOLUTION INTRAMUSCULAR; INTRAVENOUS
Status: COMPLETED
Start: 2024-01-28 | End: 2024-01-28

## 2024-01-28 RX ADMIN — DIAZEPAM 5 MG: 5 INJECTION, SOLUTION INTRAMUSCULAR; INTRAVENOUS at 10:24

## 2024-01-28 RX ADMIN — SODIUM CHLORIDE 1000 ML: 9 INJECTION, SOLUTION INTRAVENOUS at 07:18

## 2024-01-28 RX ADMIN — SODIUM CHLORIDE 500 ML: 9 INJECTION, SOLUTION INTRAVENOUS at 11:42

## 2024-01-28 RX ADMIN — LEVETIRACETAM 1000 MG: 100 INJECTION, SOLUTION INTRAVENOUS at 07:19

## 2024-01-28 ASSESSMENT — PAIN - FUNCTIONAL ASSESSMENT: PAIN_FUNCTIONAL_ASSESSMENT: NONE - DENIES PAIN

## 2024-01-28 ASSESSMENT — LIFESTYLE VARIABLES
HOW OFTEN DO YOU HAVE A DRINK CONTAINING ALCOHOL: NEVER
HOW MANY STANDARD DRINKS CONTAINING ALCOHOL DO YOU HAVE ON A TYPICAL DAY: PATIENT DOES NOT DRINK

## 2024-01-28 NOTE — ED PROVIDER NOTES
SEP-1 Core Measure due to severe sepsis or septic shock?   No   Exclusion criteria - the patient is NOT to be included for SEP-1 Core Measure due to:  Infection is not suspected      MDM and ED Course    Patient afebrile and nontoxic.  No distress.  No clinically evident seizure-like activity on my initial evaluation, she is drowsy however follows all commands and answers questions appropriately before falling back asleep.  No hypoglycemia.  Neuro exam nonfocal, nothing to suggest CVA/TIA.  My suspicion for CNS infection is very low.  Labs workup is overall reassuring with no evidence of acute endorgan dysfunction or clinically significant electrolyte derangement.  Patient did remain drowsy in the emergency department for several hours, at this point did proceed with CT head which showed no evidence of mass effect, hemorrhage or other acute finding.  Toxicology screen is unrevealing.  Over her emergency department course, patient did return to full alertness, however had another witnessed GCT seizure for which she received intravenous diazepam.  Had previously received IV Keppra.  Patient again postictal, given her repeated seizures, at this point felt prudent to admit for further evaluation and care.  I discussed case with Dr. Diego who will admit.  Also discussed case with Dr. Leigh for neurology who recommends increase of patient's Vimpat from 50 mg daily to 75 mg daily, on record review she was recently placed on this medication in December 2023.  While awaiting transfer to inpatient bed, patient did again regain full alertness and requested to leave the hospital against medical I reevaluated patient to find her fully alert, oriented x 4 with clear speech and linear thought process.  She is ambulatory in the emergency department without difficulty.  She does have the capacity to make her own medical decisions.  Did continue to recommend admission for further treatment of her repeated seizures, however patient

## 2024-01-28 NOTE — H&P
Packs/Day From To Years    0.5   10.0      Smokeless Tobacco Use  Never              Alcohol History       Alcohol Use Status  Not Currently              Drug Use       Drug Use Status  Never              Sexual Activity       Sexually Active  Yes Partners  Male                    Fam History: No family history on file.    PFSH: The above PMHx, PSHx, SocHx, FamHx has been reviewed by myself. (1 area for detailed, 2-3 for comprehensive)      Code Status: Prior    Meds - following list of home medications fromelectronic chart has been reviewed by myself  Prior to Admission medications    Medication Sig Start Date End Date Taking? Authorizing Provider   nicotine (NICODERM CQ) 21 MG/24HR Place 1 patch onto the skin daily 12/11/23 8/19/24  Hudson Ayala MD   lacosamide (VIMPAT) 50 MG TABS tablet Take 1 tablet by mouth 2 times daily for 60 days. Max Daily Amount: 100 mg 12/3/23 2/1/24  Gilbert Pickens MD   levETIRAcetam (KEPPRA) 750 MG tablet Take 2 tablets by mouth 2 times daily 12/26/22 1/28/24  Jason Brizuela MD         No Known Allergies          EXAM: (2-7 system for EPF/Detailed, ?8 for Comprehensive)  /73   Pulse 68   Temp 98.3 °F (36.8 °C) (Oral)   Resp (!) 9   Ht 1.702 m (5' 7\")   Wt 59 kg (130 lb)   SpO2 100%   BMI 20.36 kg/m²   Constitutional: vitals as above: alert, appears stated age and cooperative    Head: Normocephalic, without obvious abnormality, atraumatic    Eyes:lids and lashes normal, conjunctivae and sclerae normal and pupils equal, round, reactive to light and accomodation    EMNT: external ears normal, nares midline    Neck: no carotid bruit, supple, symmetrical, trachea midline and thyroid not enlarged, symmetric, no tenderness/mass/nodules     Respiratory: clear to auscultation and percussion bilaterally with normal respiratory effort    Cardiovascular: normal rate, regular rhythm, normal S1 and S2 and no murmurs    Gastrointestinal: soft, non-tender, non-distended, normal

## 2024-01-28 NOTE — DISCHARGE SUMMARY
status FINDINGS: BRAIN/VENTRICLES: There is no acute intracerebral hemorrhage or extra-axial fluid collection.  The ventricles and sulci are within normal limits. ORBITS: The orbits are unremarkable. SINUSES: Moderate mucosal hypertrophy involves the bilateral sphenoid sinuses. SOFT TISSUES/SKULL:  The calvarium is intact.     1. No acute intracranial abnormality.     XR CHEST PORTABLE    Result Date: 1/28/2024  EXAMINATION: ONE XRAY VIEW OF THE CHEST 1/28/2024 6:30 am COMPARISON: 12/02/2023 HISTORY: ORDERING SYSTEM PROVIDED HISTORY: seizure TECHNOLOGIST PROVIDED HISTORY: Reason for exam:->seizure FINDINGS: Cardiac leads project over the chest.  No confluent airspace disease.  No significant pleural effusion.  No pneumothorax.  Cardiac and mediastinal silhouettes are similar to prior.     No acute cardiopulmonary disease.         Discharge Exam:  AMA    Disposition: AMA    Condition: AMA    Discharge Medications:     Medication List        CONTINUE taking these medications      lacosamide 50 MG Tabs tablet  Commonly known as: VIMPAT  Take 1 tablet by mouth 2 times daily for 60 days. Max Daily Amount: 100 mg     levETIRAcetam 750 MG tablet  Commonly known as: KEPPRA  Take 2 tablets by mouth 2 times daily            ASK your doctor about these medications      nicotine 21 MG/24HR  Commonly known as: NICODERM CQ  Place 1 patch onto the skin daily               Where to Get Your Medications        You can get these medications from any pharmacy    Bring a paper prescription for each of these medications  lacosamide 50 MG Tabs tablet       Information about where to get these medications is not yet available    Ask your nurse or doctor about these medications  levETIRAcetam 750 MG tablet            Allergies:  No Known Allergies    Follow up Instructions:  Could not give, pt left ama    Signed:  Xu Diego MD  1/28/2024

## 2024-01-28 NOTE — ED NOTES
Dr. Diego notified of Pt's decision to leave AMA, verbalized understanding.    
Followed up with Jamari Robbins on 1/28/2024 at 1349. Patient left the ED with a disposition of AMA on . Patient cited personal obligations and financial/legal obligations as reason. Advised patient to follow up with a primary care physician or return to the Emergency Department if symptoms worsen.   Pt signed AMA form.  Pt left without paperwork and prescriptions.       Trini Rasmussen RN      
Left message for patients nurse on 3T to review patients chart for transfer of care.      
Left message for patients nurse on CVU for Tele overflow to review patients chart for transfer of care.      
MD at bedside, pt started having a seizure, lasted approx 30-45 seconds. IV valium given. Pt hypoxic following seizure at 69%, placed on NRB. O2 back in 90's at this time  
Paged Dr Diego for pts BP reading.  
Pt came from home for seizures.  No seizure activity since this RN arrival at 0700.    Pt responds to verbal stimuli.  Skin appropriate for ethnicity, dry and intact.  No signs of acute distress noted at this time. Regular respiratory pattern, normal respiratory depth, unlabored respirations.      Patient placed on a continuous pulse oximetry and telemetry monitoring. Bedside Monitor on with Alarms audible and alarms set.  Patient on cycling blood pressure.     Safety measures and Fall risk precautions in place, ED bed locked in lowest position, bed side table within reach, bed alarm on, and call light in reach.  Seizure pads in place.  Plan of care ongoing.  Warm blanket given to pt. Patient expresses no other needs at this time.     
Pt refusing urine test at this time. Dr Bey made aware.   
Report given to NEW Teresa.  
Urine collection via Straight Catheterization using sterile technique per MD order.  Patient tolerated well.      
Seizure disorder (HCC)        Assessment    Vitals/MEWS: MEWS Score: 1  Level of Consciousness: Alert (0)   Vitals:    01/28/24 1043 01/28/24 1103 01/28/24 1113 01/28/24 1133   BP: (!) 98/59 103/66 99/68 (!) 88/52   Pulse: 100 91 86 71   Resp: 20 18 17 14   Temp:       TempSrc:       SpO2: 93% 97% 99% 100%   Weight:       Height:         FiO2 (%): n/a  O2 Flow Rate: O2 Device: None (Room air)    Cardiac Rhythm:    Pain Assessment:  [x] Verbal [] Harrell [place] Scale  Pain Scale: Pain Assessment  Pain Assessment: None - Denies Pain  Last documented pain score (0-10 scale)    Last documented pain medication administered: n/a  Mental Status: disoriented  Orientation Level:    NIH Score:    C-SSRS: Risk of Suicide: No Risk  Bedside swallow:    Ashley Coma Scale (GCS): [place] Coma Scale  Eye Opening: Spontaneous  Best Verbal Response: Oriented  Best Motor Response: Obeys commands  [place] Coma Scale Score: 15  Active LDA's:   Peripheral IV 01/28/24 Right Forearm (Active)     PO Status:   Pertinent or High Risk Medications/Drips: no   If Yes, please provide details: n/a  Pending Blood Product Administration: no       You may also review the ED PT Care Timeline found under the Summary Nursing Index tab.    Recommendation    Pending orders - ED orders complete    Plan for Discharge (if known): home  Additional Comments: 2 seizures at home, seizure at  1023 in the ER. Post ictal and low BP at this time.   If any further questions, please call Sending RN at [phone]    Electronically signed by: Electronically signed by Trini Rasmussen RN on 1/28/2024 at 11:35 AM

## 2024-01-28 NOTE — PROGRESS NOTES
Pt admitted for seizures, noncompliance    Full h+p to follow    Active Hospital Problems    Diagnosis Date Noted    Tobacco dependence syndrome [F17.200] 01/28/2024    Noncompliance [Z91.199] 09/18/2021    Underweight [R63.6] 09/18/2021    Epilepsy with altered consciousness without intractable epilepsy (HCC) [G40.909]        Please use [employer] to contact me with any questions during the day.   The hospitalist service will provide cross-coverage for this patient from 7pm to 7am.    During those hours, contact the on-call hospitalist MD/DON for questions.

## 2024-01-30 ENCOUNTER — TELEPHONE (OUTPATIENT)
Dept: FAMILY MEDICINE CLINIC | Age: 34
End: 2024-01-30

## 2024-01-30 NOTE — TELEPHONE ENCOUNTER
Care Transitions Initial Follow Up Call    Outreach made within 2 business days of discharge: Yes    Patient: Jamari Robbins Patient : 1990   MRN: [id number]  Reason for Admission: There are no discharge diagnoses documented for the most recent discharge.  Discharge Date: 24       Spoke with: patient    Discharge department/facility: [facility] Interactive Patient Contact:  Was patient able to fill all prescriptions: Yes  Was patient instructed to bring all medications to the follow-up visit: Yes  Is patient taking all medications as directed in the discharge summary? Yes  Does patient understand their discharge instructions: Yes  Does patient have questions or concerns that need addressed prior to 7-14 day follow up office visit: no    Scheduled appointment with PCP within 7-14 days    Follow Up  Future Appointments   Date Time Provider Department Center   3/11/2024  5:30 PM Hudson Ayala MD [facility]  ALLI Villasenor LPN

## 2024-02-02 ENCOUNTER — OFFICE VISIT (OUTPATIENT)
Dept: FAMILY MEDICINE CLINIC | Age: 34
End: 2024-02-02
Payer: COMMERCIAL

## 2024-02-02 VITALS
BODY MASS INDEX: 19.84 KG/M2 | DIASTOLIC BLOOD PRESSURE: 66 MMHG | HEART RATE: 86 BPM | HEIGHT: 67 IN | SYSTOLIC BLOOD PRESSURE: 98 MMHG | OXYGEN SATURATION: 95 % | WEIGHT: 126.4 LBS

## 2024-02-02 DIAGNOSIS — Z91.199 NON COMPLIANCE WITH MEDICAL TREATMENT: ICD-10-CM

## 2024-02-02 DIAGNOSIS — G40.909 EPILEPSY WITH ALTERED CONSCIOUSNESS WITHOUT INTRACTABLE EPILEPSY (HCC): Primary | ICD-10-CM

## 2024-02-02 DIAGNOSIS — Z71.89 ENCOUNTER FOR MEDICATION REVIEW AND COUNSELING: ICD-10-CM

## 2024-02-02 DIAGNOSIS — Z76.89 FREQUENT PATIENT IN EMERGENCY DEPARTMENT: ICD-10-CM

## 2024-02-02 PROCEDURE — 1111F DSCHRG MED/CURRENT MED MERGE: CPT | Performed by: FAMILY MEDICINE

## 2024-02-02 PROCEDURE — G8420 CALC BMI NORM PARAMETERS: HCPCS | Performed by: FAMILY MEDICINE

## 2024-02-02 PROCEDURE — G8427 DOCREV CUR MEDS BY ELIG CLIN: HCPCS | Performed by: FAMILY MEDICINE

## 2024-02-02 PROCEDURE — 4004F PT TOBACCO SCREEN RCVD TLK: CPT | Performed by: FAMILY MEDICINE

## 2024-02-02 PROCEDURE — G8484 FLU IMMUNIZE NO ADMIN: HCPCS | Performed by: FAMILY MEDICINE

## 2024-02-02 PROCEDURE — 99214 OFFICE O/P EST MOD 30 MIN: CPT | Performed by: FAMILY MEDICINE

## 2024-02-02 RX ORDER — LEVETIRACETAM 750 MG/1
1500 TABLET ORAL 2 TIMES DAILY
Qty: 120 TABLET | Refills: 5 | Status: SHIPPED | OUTPATIENT
Start: 2024-02-02 | End: 2024-07-31

## 2024-02-02 RX ORDER — LACOSAMIDE 100 MG/1
100 TABLET ORAL 2 TIMES DAILY
Qty: 60 TABLET | Refills: 5 | Status: SHIPPED | OUTPATIENT
Start: 2024-02-02 | End: 2024-07-31

## 2024-02-02 ASSESSMENT — ENCOUNTER SYMPTOMS
ABDOMINAL PAIN: 0
COUGH: 0
SINUS PRESSURE: 0
BLOOD IN STOOL: 0
CONSTIPATION: 0
SHORTNESS OF BREATH: 0
WHEEZING: 0
BACK PAIN: 0
DIARRHEA: 0
NAUSEA: 0
TROUBLE SWALLOWING: 0
ABDOMINAL DISTENTION: 0
VOMITING: 0
CHEST TIGHTNESS: 0
RHINORRHEA: 0

## 2024-02-02 NOTE — PROGRESS NOTES
Jamari Robbins   34 y.o. female   1990    HPI:    Patient was last seen by me on 12/11/2023.      Reason(s) for visit:   Chief Complaint   Patient presents with    Follow-up    Seizures     -Interval history-    [x] No new significant health event(s)/problem(s) occurred since our last office visit.    [x] No new health issues/concerns discussed during today's office visit.    [] New health issue(s)/concern(s):    [] Other noteworthy comment(s):     -Chronic health issue(s)-    Epilepsy:  -Diagnosed with seizure: 11 years old  -[facility] seizure: No  -Hx of head trauma: No  -Meds tried: Dilantin, Keppra, Vimpat.  -Illicit drug use: denied  -Notable imaging: MRI brain w/o contrast - 9/18/2021 - normal.    Updates:  -Pt had breakthrough seizure around 1/28/2024 and was seen at [facility].  She is frequent patient who goes to the ER.  Her last break-through seizure was in early Dec 2023.  She went to [facility] ER again for this issue. She ended leaving AMA.  -She had portable CXR and CT head w/o contrast were unremarkable.  -Pt reported that since taking Vimpat, she has been more irritable at times, but no issues of suicidal or homicidal ideation.  -She endorsed low appetite.  Wt has been stable in the mid 120's.  Assured pt her BMI is WNL.  -Pt reported that she has relied others on for transportation and does not drive.    No Known Allergies    Current Outpatient Medications on File Prior to Visit   Medication Sig Dispense Refill    nicotine (NICODERM CQ) 21 MG/24HR Place 1 patch onto the skin daily 42 patch 5     No current facility-administered medications on file prior to visit.        No family history on file.    Social History     Tobacco Use    Smoking status: Every Day     Current packs/day: 0.50     Average packs/day: 0.5 packs/day for 10.0 years (5.0 ttl pk-yrs)     Types: Cigarettes    Smokeless tobacco: Never   Substance Use Topics    Alcohol use: Not Currently        Lab Results   Component